# Patient Record
Sex: FEMALE | Race: WHITE | NOT HISPANIC OR LATINO | Employment: OTHER | URBAN - METROPOLITAN AREA
[De-identification: names, ages, dates, MRNs, and addresses within clinical notes are randomized per-mention and may not be internally consistent; named-entity substitution may affect disease eponyms.]

---

## 2017-01-30 ENCOUNTER — ALLSCRIPTS OFFICE VISIT (OUTPATIENT)
Dept: OTHER | Facility: OTHER | Age: 82
End: 2017-01-30

## 2017-02-03 ENCOUNTER — OFFICE VISIT (OUTPATIENT)
Dept: URGENT CARE | Age: 82
End: 2017-02-03
Payer: COMMERCIAL

## 2017-02-03 PROCEDURE — 99203 OFFICE O/P NEW LOW 30 MIN: CPT | Performed by: FAMILY MEDICINE

## 2017-02-03 PROCEDURE — G0463 HOSPITAL OUTPT CLINIC VISIT: HCPCS | Performed by: FAMILY MEDICINE

## 2017-02-17 ENCOUNTER — GENERIC CONVERSION - ENCOUNTER (OUTPATIENT)
Dept: OTHER | Facility: OTHER | Age: 82
End: 2017-02-17

## 2017-03-07 ENCOUNTER — GENERIC CONVERSION - ENCOUNTER (OUTPATIENT)
Dept: OTHER | Facility: OTHER | Age: 82
End: 2017-03-07

## 2017-03-30 DIAGNOSIS — E03.9 HYPOTHYROIDISM: ICD-10-CM

## 2017-03-30 DIAGNOSIS — E78.5 HYPERLIPIDEMIA: ICD-10-CM

## 2017-05-04 ENCOUNTER — APPOINTMENT (OUTPATIENT)
Dept: LAB | Facility: CLINIC | Age: 82
End: 2017-05-04
Payer: COMMERCIAL

## 2017-05-04 DIAGNOSIS — E03.9 HYPOTHYROIDISM: ICD-10-CM

## 2017-05-04 DIAGNOSIS — E78.5 HYPERLIPIDEMIA: ICD-10-CM

## 2017-05-04 LAB
ALBUMIN SERPL BCP-MCNC: 3.3 G/DL (ref 3.5–5)
ALP SERPL-CCNC: 77 U/L (ref 46–116)
ALT SERPL W P-5'-P-CCNC: 28 U/L (ref 12–78)
ANION GAP SERPL CALCULATED.3IONS-SCNC: 6 MMOL/L (ref 4–13)
AST SERPL W P-5'-P-CCNC: 31 U/L (ref 5–45)
BASOPHILS # BLD AUTO: 0.03 THOUSANDS/ΜL (ref 0–0.1)
BASOPHILS NFR BLD AUTO: 1 % (ref 0–1)
BILIRUB SERPL-MCNC: 1.3 MG/DL (ref 0.2–1)
BUN SERPL-MCNC: 19 MG/DL (ref 5–25)
CALCIUM SERPL-MCNC: 9.2 MG/DL (ref 8.3–10.1)
CHLORIDE SERPL-SCNC: 106 MMOL/L (ref 100–108)
CHOLEST SERPL-MCNC: 236 MG/DL (ref 50–200)
CO2 SERPL-SCNC: 28 MMOL/L (ref 21–32)
CREAT SERPL-MCNC: 0.73 MG/DL (ref 0.6–1.3)
EOSINOPHIL # BLD AUTO: 0.15 THOUSAND/ΜL (ref 0–0.61)
EOSINOPHIL NFR BLD AUTO: 2 % (ref 0–6)
ERYTHROCYTE [DISTWIDTH] IN BLOOD BY AUTOMATED COUNT: 14.2 % (ref 11.6–15.1)
GFR SERPL CREATININE-BSD FRML MDRD: >60 ML/MIN/1.73SQ M
GLUCOSE P FAST SERPL-MCNC: 85 MG/DL (ref 65–99)
HCT VFR BLD AUTO: 42.1 % (ref 34.8–46.1)
HDLC SERPL-MCNC: 51 MG/DL (ref 40–60)
HGB BLD-MCNC: 13 G/DL (ref 11.5–15.4)
LDLC SERPL CALC-MCNC: 158 MG/DL (ref 0–100)
LYMPHOCYTES # BLD AUTO: 2.93 THOUSANDS/ΜL (ref 0.6–4.47)
LYMPHOCYTES NFR BLD AUTO: 44 % (ref 14–44)
MCH RBC QN AUTO: 29.9 PG (ref 26.8–34.3)
MCHC RBC AUTO-ENTMCNC: 30.9 G/DL (ref 31.4–37.4)
MCV RBC AUTO: 97 FL (ref 82–98)
MONOCYTES # BLD AUTO: 0.51 THOUSAND/ΜL (ref 0.17–1.22)
MONOCYTES NFR BLD AUTO: 8 % (ref 4–12)
NEUTROPHILS # BLD AUTO: 3 THOUSANDS/ΜL (ref 1.85–7.62)
NEUTS SEG NFR BLD AUTO: 45 % (ref 43–75)
NRBC BLD AUTO-RTO: 0 /100 WBCS
PLATELET # BLD AUTO: 211 THOUSANDS/UL (ref 149–390)
PMV BLD AUTO: 9.6 FL (ref 8.9–12.7)
POTASSIUM SERPL-SCNC: 4.1 MMOL/L (ref 3.5–5.3)
PROT SERPL-MCNC: 7.3 G/DL (ref 6.4–8.2)
RBC # BLD AUTO: 4.35 MILLION/UL (ref 3.81–5.12)
SODIUM SERPL-SCNC: 140 MMOL/L (ref 136–145)
T4 FREE SERPL-MCNC: 1.18 NG/DL (ref 0.76–1.46)
TRIGL SERPL-MCNC: 133 MG/DL
TSH SERPL DL<=0.05 MIU/L-ACNC: 1.95 UIU/ML (ref 0.36–3.74)
WBC # BLD AUTO: 6.64 THOUSAND/UL (ref 4.31–10.16)

## 2017-05-04 PROCEDURE — 84443 ASSAY THYROID STIM HORMONE: CPT

## 2017-05-04 PROCEDURE — 80053 COMPREHEN METABOLIC PANEL: CPT

## 2017-05-04 PROCEDURE — 36415 COLL VENOUS BLD VENIPUNCTURE: CPT

## 2017-05-04 PROCEDURE — 84439 ASSAY OF FREE THYROXINE: CPT

## 2017-05-04 PROCEDURE — 85025 COMPLETE CBC W/AUTO DIFF WBC: CPT

## 2017-05-04 PROCEDURE — 80061 LIPID PANEL: CPT

## 2017-05-05 ENCOUNTER — GENERIC CONVERSION - ENCOUNTER (OUTPATIENT)
Dept: OTHER | Facility: OTHER | Age: 82
End: 2017-05-05

## 2017-06-05 ENCOUNTER — ALLSCRIPTS OFFICE VISIT (OUTPATIENT)
Dept: OTHER | Facility: OTHER | Age: 82
End: 2017-06-05

## 2017-07-10 ENCOUNTER — APPOINTMENT (OUTPATIENT)
Dept: LAB | Facility: CLINIC | Age: 82
End: 2017-07-10
Payer: COMMERCIAL

## 2017-07-10 DIAGNOSIS — B35.1 TINEA UNGUIUM: ICD-10-CM

## 2017-07-10 LAB
ALBUMIN SERPL BCP-MCNC: 3.5 G/DL (ref 3.5–5)
ALP SERPL-CCNC: 83 U/L (ref 46–116)
ALT SERPL W P-5'-P-CCNC: 25 U/L (ref 12–78)
AST SERPL W P-5'-P-CCNC: 26 U/L (ref 5–45)
BASOPHILS # BLD AUTO: 0.03 THOUSANDS/ΜL (ref 0–0.1)
BASOPHILS NFR BLD AUTO: 1 % (ref 0–1)
BILIRUB DIRECT SERPL-MCNC: 0.14 MG/DL (ref 0–0.2)
BILIRUB SERPL-MCNC: 0.63 MG/DL (ref 0.2–1)
EOSINOPHIL # BLD AUTO: 0.17 THOUSAND/ΜL (ref 0–0.61)
EOSINOPHIL NFR BLD AUTO: 3 % (ref 0–6)
ERYTHROCYTE [DISTWIDTH] IN BLOOD BY AUTOMATED COUNT: 14.6 % (ref 11.6–15.1)
HCT VFR BLD AUTO: 41.4 % (ref 34.8–46.1)
HGB BLD-MCNC: 12.7 G/DL (ref 11.5–15.4)
LYMPHOCYTES # BLD AUTO: 2.35 THOUSANDS/ΜL (ref 0.6–4.47)
LYMPHOCYTES NFR BLD AUTO: 37 % (ref 14–44)
MCH RBC QN AUTO: 29.5 PG (ref 26.8–34.3)
MCHC RBC AUTO-ENTMCNC: 30.7 G/DL (ref 31.4–37.4)
MCV RBC AUTO: 96 FL (ref 82–98)
MONOCYTES # BLD AUTO: 0.5 THOUSAND/ΜL (ref 0.17–1.22)
MONOCYTES NFR BLD AUTO: 8 % (ref 4–12)
NEUTROPHILS # BLD AUTO: 3.37 THOUSANDS/ΜL (ref 1.85–7.62)
NEUTS SEG NFR BLD AUTO: 51 % (ref 43–75)
NRBC BLD AUTO-RTO: 0 /100 WBCS
PLATELET # BLD AUTO: 216 THOUSANDS/UL (ref 149–390)
PMV BLD AUTO: 9.6 FL (ref 8.9–12.7)
PROT SERPL-MCNC: 7.5 G/DL (ref 6.4–8.2)
RBC # BLD AUTO: 4.31 MILLION/UL (ref 3.81–5.12)
WBC # BLD AUTO: 6.43 THOUSAND/UL (ref 4.31–10.16)

## 2017-07-10 PROCEDURE — 80076 HEPATIC FUNCTION PANEL: CPT

## 2017-07-10 PROCEDURE — 85025 COMPLETE CBC W/AUTO DIFF WBC: CPT

## 2017-07-10 PROCEDURE — 36415 COLL VENOUS BLD VENIPUNCTURE: CPT

## 2017-07-11 ENCOUNTER — GENERIC CONVERSION - ENCOUNTER (OUTPATIENT)
Dept: OTHER | Facility: OTHER | Age: 82
End: 2017-07-11

## 2017-09-05 DIAGNOSIS — B35.1 TINEA UNGUIUM: ICD-10-CM

## 2017-10-13 ENCOUNTER — ALLSCRIPTS OFFICE VISIT (OUTPATIENT)
Dept: OTHER | Facility: OTHER | Age: 82
End: 2017-10-13

## 2017-10-14 NOTE — PROGRESS NOTES
Assessment  1  Hypothyroidism (244 9) (E03 9)   2  Benign essential hypertension (401 1) (I10)   3  Mixed hyperlipidemia (272 2) (E78 2)   4  Palpitations (785 1) (R00 2)    Plan  Screening for genitourinary condition    · *VB - Urinary Incontinence Screen (Dx Z13 89 Screen for UI); Status:Complete - Retrospective By  Protocol Authorization;   Done: 84ZVJ6549 09:29AM    Discussion/Summary  Discussion Summary:   Hypothyroid: continue med, last TFTs in May 2017 normal  continue blood pressure medication(s)  Avoid saturated fats  Moving towards a more plant-based diet will also improve your cholesterol  previous EKG has shown frequent PACs, and pt has seen cardiology with [facility]  Chief Complaint  Chief Complaint Chronic Condition [facility]: Patient is here today for follow up of chronic conditions described in HPI  History of Present Illness  HPI: Hypothyroidism: patient reports compliance with med and no significant signs of overtreatment or undertreatment  patient watched diet for this  patient reports compliance with med(s) and no adverse side effects  No lightheadedness, no chest pain, no shortness of breath, no dry cough  Review of Systems  Complete-Female:   Constitutional: no fever,-- no chills-- and-- not feeling tired  Cardiovascular: the heart rate was not slow,-- no chest pain,-- the heart rate was not fast,-- no palpitations-- and-- no lower extremity edema  Respiratory: no shortness of breath,-- no cough,-- no wheezing-- and-- no shortness of breath during exertion  Gastrointestinal: no constipation-- and-- no diarrhea  Genitourinary: no dysuria-- and-- no incontinence  Psychiatric: no anxiety-- and-- no depression  Preventive Quality 65 Older:   Preventive Quality 65 and Older: Falls Risk: The patient fell 0 times in the past 12 months  Urinary Incontinence Symptoms includes: no urinary incontinence       Active Problems  1  Abnormal ECG (794 31) (R94 31)   2   Advance directive discussed with patient (V65 49) (Z71 89)   3  Allergic rhinitis (477 9) (J30 9)   4  Benign essential hypertension (401 1) (I10)   5  Cataract (366 9) (H26 9)   6  Encounter for screening for osteoporosis (V82 81) (Z13 820)   7  Hypothyroidism (244 9) (E03 9)   8  Insomnia (780 52) (G47 00)   9  Mixed hyperlipidemia (272 2) (E78 2)   10  Need for immunization against influenza (V04 81) (Z23)   11  Need for vaccination with 13-polyvalent pneumococcal conjugate vaccine (V03 82) (Z23)   12  Onychomycosis of toenail (110 1) (B35 1)   13  Screening for genitourinary condition (V81 6) (Z13 89)   14  Swelling of right knee joint (719 06) (M25 461)    Surgical History  1  History of Appendectomy   2  History of  Section   3  History of Gallbladder Surgery    Family History  Mother    1  Family history of lung cancer (V16 1) (Z80 1)   2  Family history of Lung cancer (162 9) (C34 90)  Father    3  Family history of Diabetes (250 00) (E11 9)   4  Family history of diabetes mellitus (V18 0) (Z83 3)  Son    5  Family history of atrial fibrillation (V17 49) (Z82 49)    Social History   · Former smoker (V1 82) (K44 755)   · Lives with adult children   · Never a smoker   · No alcohol use   · No drug use   · Retired   ·    ·     Current Meds   1  Fluticasone Propionate 50 MCG/ACT Nasal Suspension; USE 1 SPRAY IN EACH NOSTRIL ONCE   DAILY; Therapy: 31XLM6092 to Recorded   2  Ipratropium Bromide 0 03 % Nasal Solution; USE 2 SPRAYS IN EACH NOSTRIL 3 TIMES DAILY AS   NEEDED; Therapy: 2017 to (Evaluate:12Kfc6786)  Requested for: 2017; Last Rx:2017   Ordered   3  Levothyroxine Sodium 112 MCG Oral Tablet; TAKE 1 TABLET EVERY MORNING; Last Rx:2017   Ordered   4  Losartan Potassium 25 MG Oral Tablet; TAKE 1 TABLET DAILY AS DIRECTED; Therapy: 53EEG4001 to (Evaluate:2017)  Requested for: 69ETV9449; Last Rx:2017   Ordered   5  Metoprolol Tartrate 25 MG Oral Tablet;  Take 1 tablet twice daily; Therapy: 22QSQ7056 to (Evaluate:10Itk2832)  Requested for: 70MTR0452; Last Rx:13Dky7273   Ordered   6  Terbinafine HCl - 250 MG Oral Tablet; TAKE 1 TABLET DAILY; Therapy: 76YPR8095 to (Evaluate:67Xjx1274)  Requested for: 20YOM8601; Last Rx:50Shz2458   Ordered    Allergies  1  No Known Drug Allergies    Vitals  Vital Signs    Recorded: 36GAU1465 09:39AM Recorded: 43HAK6357 09:21AM   Temperature  97 5 F, Oral   Heart Rate  75   Respiration  16   Systolic 006    Diastolic 88    Weight  246 lb    BMI Calculated  33 2   BSA Calculated  1 74   O2 Saturation  95     Physical Exam    Constitutional   General appearance: No acute distress, well appearing and well nourished  Head and Face   Head and face: Normal     Eyes   Conjunctiva and lids: Abnormal  -- red conjuctiva  Ears, Nose, Mouth, and Throat   External inspection of ears and nose: Normal     Neck   Neck: Supple, symmetric, trachea midline, no masses  Thyroid: Normal, no thyromegaly  Pulmonary   Respiratory effort: No increased work of breathing or signs of respiratory distress  Auscultation of lungs: Clear to auscultation  Cardiovascular   Auscultation of heart: Abnormal   The heart rate was normal  The rhythm was irregularly irregular  Frequent ectopy on exam  no murmurs were heard  Carotid pulses: 2+ bilaterally  Examination of extremities for edema and/or varicosities: Normal     Lymphatic   Palpation of lymph nodes in neck: No lymphadenopathy  Neurologic   Cortical function: Normal mental status  Psychiatric   Judgment and insight: Normal     Orientation to person, place, and time: Normal     Recent and remote memory: Intact      Mood and affect: Normal        Results/Data  *VB - Urinary Incontinence Screen (Dx Z13 89 Screen for UI) 87DQA3828 09:29AM [email or web address]     Test Name Result Flag Reference   Urinary Incontinence Assessment 17WWQ5079       PHQ-2 Adult Depression Screening 65SSZ7586 09:26AM Álvaro Holt Test Name Result Flag Reference   PHQ-2 Adult Depression Score 0     Over the last two weeks, how often have you been bothered by any of the following problems? Little interest or pleasure in doing things: Not at all - 0  Feeling down, depressed, or hopeless: Not at all - 0   PHQ-2 Adult Depression Screening Negative         Health Management  Encounter for screening for osteoporosis   * DXA BONE DENSITY SPINE HIP AND PELVIS; every 2 weeks;  Next Permanently Deferred;     Signatures   Electronically signed by : LOPEZ Mena ; Oct 13 2017  9:47AM EST                       (Author)

## 2018-01-12 VITALS
WEIGHT: 169 LBS | TEMPERATURE: 97.4 F | SYSTOLIC BLOOD PRESSURE: 120 MMHG | OXYGEN SATURATION: 98 % | HEART RATE: 71 BPM | HEIGHT: 60 IN | RESPIRATION RATE: 16 BRPM | BODY MASS INDEX: 33.18 KG/M2 | DIASTOLIC BLOOD PRESSURE: 66 MMHG

## 2018-01-13 VITALS
TEMPERATURE: 97.5 F | BODY MASS INDEX: 33.2 KG/M2 | HEART RATE: 75 BPM | SYSTOLIC BLOOD PRESSURE: 130 MMHG | RESPIRATION RATE: 16 BRPM | WEIGHT: 170 LBS | DIASTOLIC BLOOD PRESSURE: 88 MMHG | OXYGEN SATURATION: 95 %

## 2018-01-13 NOTE — RESULT NOTES
Verified Results  (1) CBC/PLT/DIFF 25WZE8111 11:09AM Rachel Tobias Order Number: [id number]     Test Name Result Flag Reference   WBC COUNT 6 64 Thousand/uL  4 31-10 16   RBC COUNT 4 35 Million/uL  3 81-5 12   HEMOGLOBIN 13 0 g/dL  11 5-15 4   HEMATOCRIT 42 1 %  34 8-46  1   MCV 97 fL  82-98   MCH 29 9 pg  26 8-34 3   MCHC 30 9 g/dL L 31 4-37 4   RDW 14 2 %  11 6-15 1   MPV 9 6 fL  8 9-12 7   PLATELET COUNT 609 Thousands/uL  149-390   nRBC AUTOMATED 0 /100 WBCs     NEUTROPHILS RELATIVE PERCENT 45 %  43-75   LYMPHOCYTES RELATIVE PERCENT 44 %  14-44   MONOCYTES RELATIVE PERCENT 8 %  4-12   EOSINOPHILS RELATIVE PERCENT 2 %  0-6   BASOPHILS RELATIVE PERCENT 1 %  0-1   NEUTROPHILS ABSOLUTE COUNT 3 00 Thousands/? ??L  1 85-7 62   LYMPHOCYTES ABSOLUTE COUNT 2 93 Thousands/? ??L  0 60-4 47   MONOCYTES ABSOLUTE COUNT 0 51 Thousand/? ??L  0 17-1 22   EOSINOPHILS ABSOLUTE COUNT 0 15 Thousand/? ??L  0 00-0 61   BASOPHILS ABSOLUTE COUNT 0 03 Thousands/? ??L  0 00-0 10     (1) COMPREHENSIVE METABOLIC PANEL 03VGH6643 01:44MQ Rachel Tobias Order Number: [id number]     Test Name Result Flag Reference   SODIUM 140 mmol/L  136-145   POTASSIUM 4 1 mmol/L  3 5-5 3   CHLORIDE 106 mmol/L  100-108   CARBON DIOXIDE 28 mmol/L  21-32   ANION GAP (CALC) 6 mmol/L  4-13   BLOOD UREA NITROGEN 19 mg/dL  5-25   CREATININE 0 73 mg/dL  0 60-1 30   Standardized to IDMS reference method   CALCIUM 9 2 mg/dL  8 3-10 1   BILI, TOTAL 1 30 mg/dL H 0 20-1 00   ALK PHOSPHATAS 77 U/L     ALT (SGPT) 28 U/L  12-78   AST(SGOT) 31 U/L  5-45   ALBUMIN 3 3 g/dL L 3 5-5 0   TOTAL PROTEIN 7 3 g/dL  6 4-8 2   eGFR Non-African American      >60 0 ml/min/1 73sq [facility] Disease Education Program recommendations are as follows:  GFR calculation is accurate only with a steady state creatinine  Chronic Kidney disease less than 60 ml/min/1 73 sq  meters  Kidney failure less than 15 ml/min/1 73 sq  meters     GLUCOSE FASTING 85 mg/dL  65-99     (1) LIPID PANEL, FASTING 69FTI5582 11:09AM [email or web address] Order Number: [id number]     Test Name Result Flag Reference   CHOLESTEROL 236 mg/dL H    HDL,DIRECT 51 mg/dL  40-60   Specimen collection should occur prior to Metamizole administration due to the potential for falsely depressed results  LDL CHOLESTEROL CALCULATED 158 mg/dL H 0-100   Triglyceride:         Normal              <150 mg/dl       Borderline High    150-199 mg/dl       High               200-499 mg/dl       Very High          >499 mg/dl  Cholesterol:         Desirable        <200 mg/dl      Borderline High  200-239 mg/dl      High             >239 mg/dl  HDL Cholesterol:        High    >59 mg/dL      Low     <41 mg/dL  LDL CALCULATED:    This screening LDL is a calculated result  It does not have the accuracy of the Direct Measured LDL in the monitoring of patients with hyperlipidemia and/or statin therapy  Direct Measure LDL (TCM035) must be ordered separately in these patients  TRIGLYCERIDES 133 mg/dL  <=150   Specimen collection should occur prior to N-Acetylcysteine or Metamizole administration due to the potential for falsely depressed results  (1) T4, FREE 43CTW6373 11:09AM [email or web address] Order Number: [id number]     Test Name Result Flag Reference   T4,FREE 1 18 ng/dL  0 76-1 46     (1) TSH 64CMA5500 11:09AM [employer] Order Number: [id number]     Test Name Result Flag Reference   TSH 1 950 uIU/mL  0 358-3 740   Patients undergoing fluorescein dye angiography may retain small amounts of fluorescein in the body for 48-72 hours post procedure  Samples containing fluorescein can produce falsely depressed TSH values  If the patient had this procedure,a specimen should be resubmitted post fluorescein clearance            The recommended reference ranges for TSH during pregnancy are as follows:  First trimester 0 1 to 2 5 uIU/mL  Second trimester  0 2 to 3 0 uIU/mL  Third trimester 0 3 to 3 0 uIU/m

## 2018-01-14 VITALS
BODY MASS INDEX: 33.18 KG/M2 | HEIGHT: 60 IN | RESPIRATION RATE: 16 BRPM | OXYGEN SATURATION: 96 % | HEART RATE: 69 BPM | DIASTOLIC BLOOD PRESSURE: 82 MMHG | SYSTOLIC BLOOD PRESSURE: 124 MMHG | WEIGHT: 169.03 LBS | TEMPERATURE: 97.6 F

## 2018-01-15 NOTE — RESULT NOTES
Discussion/Summary   CBC and LFTs normal     Verified Results  (1) CBC/PLT/DIFF 57YRX2408 09:39AM Kylie Stone Order Number: [id number]     Test Name Result Flag Reference   WBC COUNT 6 43 Thousand/uL  4 31-10 16   RBC COUNT 4 31 Million/uL  3 81-5 12   HEMOGLOBIN 12 7 g/dL  11 5-15 4   HEMATOCRIT 41 4 %  34 8-46  1   MCV 96 fL  82-98   MCH 29 5 pg  26 8-34 3   MCHC 30 7 g/dL L 31 4-37 4   RDW 14 6 %  11 6-15 1   MPV 9 6 fL  8 9-12 7   PLATELET COUNT 042 Thousands/uL  149-390   nRBC AUTOMATED 0 /100 WBCs     NEUTROPHILS RELATIVE PERCENT 51 %  43-75   LYMPHOCYTES RELATIVE PERCENT 37 %  14-44   MONOCYTES RELATIVE PERCENT 8 %  4-12   EOSINOPHILS RELATIVE PERCENT 3 %  0-6   BASOPHILS RELATIVE PERCENT 1 %  0-1   NEUTROPHILS ABSOLUTE COUNT 3 37 Thousands/? ??L  1 85-7 62   LYMPHOCYTES ABSOLUTE COUNT 2 35 Thousands/? ??L  0 60-4 47   MONOCYTES ABSOLUTE COUNT 0 50 Thousand/? ??L  0 17-1 22   EOSINOPHILS ABSOLUTE COUNT 0 17 Thousand/? ??L  0 00-0 61   BASOPHILS ABSOLUTE COUNT 0 03 Thousands/? ??L  0 00-0 10     (1) HEPATIC FUNCTION PANEL 55Kbl3035 09:39AM Kylie Stone Order Number: [id number]     Test Name Result Flag Reference   ALBUMIN 3 5 g/dL  3 5-5 0   ALK PHOSPHATAS 83 U/L     ALT (SGPT) 25 U/L  12-78   AST(SGOT) 26 U/L  5-45   BILI, DIRECT 0 14 mg/dL  0 00-0 20   BILI, TOTAL 0 63 mg/dL  0 20-1 00   TOTAL PROTEIN 7 5 g/dL  6 4-8 2

## 2018-02-01 ENCOUNTER — TELEPHONE (OUTPATIENT)
Dept: INTERNAL MEDICINE CLINIC | Facility: CLINIC | Age: 83
End: 2018-02-01

## 2018-02-01 NOTE — TELEPHONE ENCOUNTER
Spoke to patient  She just check her BP with a family members monitor and it was 173/67  She will start new dose and she will be calling on Monday to follow-up

## 2018-02-05 ENCOUNTER — TELEPHONE (OUTPATIENT)
Dept: INTERNAL MEDICINE CLINIC | Facility: CLINIC | Age: 83
End: 2018-02-05

## 2018-02-15 ENCOUNTER — OFFICE VISIT (OUTPATIENT)
Dept: INTERNAL MEDICINE CLINIC | Facility: CLINIC | Age: 83
End: 2018-02-15
Payer: COMMERCIAL

## 2018-02-15 VITALS
BODY MASS INDEX: 31.98 KG/M2 | WEIGHT: 169.4 LBS | RESPIRATION RATE: 16 BRPM | OXYGEN SATURATION: 95 % | HEART RATE: 77 BPM | DIASTOLIC BLOOD PRESSURE: 80 MMHG | HEIGHT: 61 IN | SYSTOLIC BLOOD PRESSURE: 138 MMHG

## 2018-02-15 DIAGNOSIS — E03.9 HYPOTHYROIDISM, UNSPECIFIED TYPE: ICD-10-CM

## 2018-02-15 DIAGNOSIS — E78.2 MIXED HYPERLIPIDEMIA: ICD-10-CM

## 2018-02-15 DIAGNOSIS — R44.1 HALLUCINATIONS, VISUAL: Primary | ICD-10-CM

## 2018-02-15 DIAGNOSIS — I10 HTN (HYPERTENSION), BENIGN: ICD-10-CM

## 2018-02-15 PROBLEM — R00.2 PALPITATIONS: Status: ACTIVE | Noted: 2017-10-13

## 2018-02-15 PROCEDURE — 3725F SCREEN DEPRESSION PERFORMED: CPT | Performed by: INTERNAL MEDICINE

## 2018-02-15 PROCEDURE — 1101F PT FALLS ASSESS-DOCD LE1/YR: CPT | Performed by: INTERNAL MEDICINE

## 2018-02-15 PROCEDURE — 99214 OFFICE O/P EST MOD 30 MIN: CPT | Performed by: INTERNAL MEDICINE

## 2018-02-15 RX ORDER — LOSARTAN POTASSIUM 25 MG/1
1 TABLET ORAL DAILY
COMMUNITY
Start: 2016-11-18 | End: 2018-04-18 | Stop reason: SDUPTHER

## 2018-02-15 RX ORDER — IPRATROPIUM BROMIDE 21 UG/1
2 SPRAY, METERED NASAL 3 TIMES DAILY PRN
COMMUNITY
Start: 2017-08-11 | End: 2019-03-26

## 2018-02-15 RX ORDER — FLUTICASONE PROPIONATE 50 MCG
1 SPRAY, SUSPENSION (ML) NASAL DAILY
COMMUNITY
Start: 2015-03-11 | End: 2019-03-26

## 2018-02-15 RX ORDER — TERBINAFINE HYDROCHLORIDE 250 MG/1
1 TABLET ORAL DAILY
COMMUNITY
Start: 2017-06-05 | End: 2018-10-25 | Stop reason: ALTCHOICE

## 2018-02-15 RX ORDER — CARVEDILOL 6.25 MG/1
6.25 TABLET ORAL 2 TIMES DAILY WITH MEALS
Qty: 60 TABLET | Refills: 5 | Status: SHIPPED | OUTPATIENT
Start: 2018-02-15 | End: 2018-08-10 | Stop reason: SDUPTHER

## 2018-02-15 RX ORDER — LEVOTHYROXINE SODIUM 112 UG/1
1 TABLET ORAL
COMMUNITY
End: 2018-12-03 | Stop reason: SDUPTHER

## 2018-02-15 NOTE — ASSESSMENT & PLAN NOTE
Most likely related to some preoccupation with thoughts about her  who passed away, possibly related with some underlying anxiety, combined with some mild sleep deprivation  Will check for other etiologies including urinary tract infection which be unlikely going on this long, structural abnormality with MRI of the brain, and checking routine lab work  On history she does not report any medications that would cause this, but will switch metoprolol to carvedilol as possible cause of hallucinations  Will also refer Neurology  Patient is interacting appropriately on exam with no signs of dementia    Discussed exploring more about possible underlying depression/anxiety if workup all normal

## 2018-02-15 NOTE — PROGRESS NOTES
Assessment/Plan:    Hallucinations, visual   Most likely related to some preoccupation with thoughts about her  who passed away, possibly related with some underlying anxiety, combined with some mild sleep deprivation  Will check for other etiologies including urinary tract infection which be unlikely going on this long, structural abnormality with MRI of the brain, and checking routine lab work  On history she does not report any medications that would cause this, but will switch metoprolol to carvedilol as possible cause of hallucinations  Will also refer Neurology  Patient is interacting appropriately on exam with no signs of dementia  Discussed exploring more about possible underlying depression/anxiety if workup all normal     Hypothyroidism   Continue medication, last thyroid function tests were normal, but will recheck now    Mixed hyperlipidemia   Continue to watch diet for this  Benign essential hypertension    Will change metoprolol to carvedilol as possible etiology of hallucinations  Diagnoses and all orders for this visit:    Hallucinations, visual  -     Comprehensive metabolic panel; Future  -     RPR; Future  -     TSH, 3rd generation with T4 reflex; Future  -     UA w Reflex to Microscopic w Reflex to Culture  -     Vitamin B12; Future  -     Vitamin D 25 hydroxy; Future  -     MRI brain wo contrast; Future  -     Ambulatory referral to Neurology; Future    HTN (hypertension), benign  -     carvedilol (COREG) 6 25 mg tablet; Take 1 tablet (6 25 mg total) by mouth 2 (two) times a day with meals    Hypothyroidism, unspecified type  -     TSH, 3rd generation with T4 reflex; Future    Mixed hyperlipidemia          Subjective:      Patient ID: James Villa Case is a 80 y o  female  Hallucinations: pt has had some hallucinations  2 weeks ago she saw 5 little girls in her bedroom, they didn't talk  She saw he dead  about a month ago    She saw a naked woman in her bedroom several months ago  They weren't scary hallucinations  She reports she was not dreaming  No other cognitive complaints  No seizure activity or headaches  No OTC cold or cough meds, no pain meds, no muscle relaxants, no sleep aids  No new meds  Pt doesn't sleep great at night  She did try zolpidem a few years ago which caused severe confusion  No UTI symptoms  Pt denies anxiety or depression, but she does report thinking about her  "all the time" since he  about 4 years ago  hypertension:  Patient had a high blood pressure reading at foot doctor     hypothyroidism: Patient reports compliance with her thyroid med        The following portions of the patient's history were reviewed and updated as appropriate: allergies, current medications, past family history, past medical history, past social history, past surgical history and problem list     No family history on file  No past medical history on file  Social History     Social History    Marital status:      Spouse name: N/A    Number of children: N/A    Years of education: N/A     Occupational History    Not on file       Social History Main Topics    Smoking status: Not on file    Smokeless tobacco: Not on file    Alcohol use Not on file    Drug use: Unknown    Sexual activity: Not on file     Other Topics Concern    Not on file     Social History Narrative    No narrative on file       Current Outpatient Prescriptions:     levothyroxine 112 mcg tablet, Take 1 tablet by mouth, Disp: , Rfl:     losartan (COZAAR) 25 mg tablet, Take 1 tablet by mouth daily, Disp: , Rfl:     carvedilol (COREG) 6 25 mg tablet, Take 1 tablet (6 25 mg total) by mouth 2 (two) times a day with meals, Disp: 60 tablet, Rfl: 5    fluticasone (FLONASE) 50 mcg/act nasal spray, 1 spray into each nostril daily, Disp: , Rfl:     ipratropium (ATROVENT) 0 03 % nasal spray, 2 sprays into each nostril 3 (three) times a day as needed, Disp: , Rfl:    terbinafine (LamISIL) 250 mg tablet, Take 1 tablet by mouth daily, Disp: , Rfl:   No Known Allergies    Review of Systems   Constitutional: Negative for chills, fatigue and fever  HENT: Negative for congestion, nosebleeds, postnasal drip and sore throat  Eyes: Negative for pain and visual disturbance  Respiratory: Negative for cough, chest tightness, shortness of breath and wheezing  Cardiovascular: Negative for chest pain, palpitations and leg swelling  Gastrointestinal: Negative for abdominal pain, constipation, diarrhea, nausea and vomiting  Endocrine: Negative for polydipsia and polyuria  Genitourinary: Negative for dysuria, flank pain and hematuria  Musculoskeletal: Negative for arthralgias  Skin: Negative for rash  Neurological: Negative for dizziness, tremors, seizures, syncope and headaches  Hematological: Does not bruise/bleed easily  Psychiatric/Behavioral: Positive for hallucinations and sleep disturbance  Negative for agitation, behavioral problems, confusion, decreased concentration, dysphoric mood, self-injury and suicidal ideas  The patient is not nervous/anxious and is not hyperactive  Objective:    Vitals:    02/15/18 0926   BP: 138/80   Pulse: 77   Resp: 16   SpO2: 95%        Physical Exam   Constitutional: She is oriented to person, place, and time  She appears well-developed and well-nourished  No distress  HENT:   Head: Normocephalic and atraumatic  Right Ear: External ear normal    Left Ear: External ear normal    Eyes: No scleral icterus  Neck: Normal range of motion  Neck supple  No tracheal deviation present  No thyromegaly present  Cardiovascular: Normal rate, regular rhythm and normal heart sounds  Pulmonary/Chest: Effort normal and breath sounds normal  No respiratory distress  She has no wheezes  She has no rales  Abdominal: Soft  Bowel sounds are normal  There is no tenderness  There is no rebound and no guarding     Musculoskeletal: She exhibits no edema  Lymphadenopathy:     She has no cervical adenopathy  Neurological: She is alert and oriented to person, place, and time  Psychiatric: She has a normal mood and affect  Her behavior is normal  Judgment and thought content normal    Vitals reviewed

## 2018-02-19 ENCOUNTER — TELEPHONE (OUTPATIENT)
Dept: INTERNAL MEDICINE CLINIC | Facility: CLINIC | Age: 83
End: 2018-02-19

## 2018-02-19 ENCOUNTER — APPOINTMENT (OUTPATIENT)
Dept: LAB | Facility: CLINIC | Age: 83
End: 2018-02-19
Payer: COMMERCIAL

## 2018-02-19 DIAGNOSIS — R44.1 HALLUCINATIONS, VISUAL: ICD-10-CM

## 2018-02-19 DIAGNOSIS — E55.9 VITAMIN D DEFICIENCY: Primary | ICD-10-CM

## 2018-02-19 DIAGNOSIS — E03.9 HYPOTHYROIDISM, UNSPECIFIED TYPE: ICD-10-CM

## 2018-02-19 LAB
25(OH)D3 SERPL-MCNC: 6 NG/ML (ref 30–100)
ALBUMIN SERPL BCP-MCNC: 3.4 G/DL (ref 3.5–5)
ALP SERPL-CCNC: 80 U/L (ref 46–116)
ALT SERPL W P-5'-P-CCNC: 25 U/L (ref 12–78)
ANION GAP SERPL CALCULATED.3IONS-SCNC: 5 MMOL/L (ref 4–13)
AST SERPL W P-5'-P-CCNC: 29 U/L (ref 5–45)
BACTERIA UR QL AUTO: NORMAL /HPF
BILIRUB SERPL-MCNC: 0.94 MG/DL (ref 0.2–1)
BILIRUB UR QL STRIP: NEGATIVE
BUN SERPL-MCNC: 17 MG/DL (ref 5–25)
CALCIUM SERPL-MCNC: 9.2 MG/DL (ref 8.3–10.1)
CHLORIDE SERPL-SCNC: 105 MMOL/L (ref 100–108)
CLARITY UR: CLEAR
CO2 SERPL-SCNC: 29 MMOL/L (ref 21–32)
COLOR UR: YELLOW
CREAT SERPL-MCNC: 0.76 MG/DL (ref 0.6–1.3)
GFR SERPL CREATININE-BSD FRML MDRD: 67 ML/MIN/1.73SQ M
GLUCOSE P FAST SERPL-MCNC: 90 MG/DL (ref 65–99)
GLUCOSE UR STRIP-MCNC: NEGATIVE MG/DL
HGB UR QL STRIP.AUTO: ABNORMAL
KETONES UR STRIP-MCNC: NEGATIVE MG/DL
LEUKOCYTE ESTERASE UR QL STRIP: ABNORMAL
NITRITE UR QL STRIP: NEGATIVE
NON-SQ EPI CELLS URNS QL MICRO: NORMAL /HPF
PH UR STRIP.AUTO: 6 [PH] (ref 4.5–8)
POTASSIUM SERPL-SCNC: 3.9 MMOL/L (ref 3.5–5.3)
PROT SERPL-MCNC: 7.6 G/DL (ref 6.4–8.2)
PROT UR STRIP-MCNC: NEGATIVE MG/DL
RBC #/AREA URNS AUTO: NORMAL /HPF
SODIUM SERPL-SCNC: 139 MMOL/L (ref 136–145)
SP GR UR STRIP.AUTO: 1.01 (ref 1–1.03)
TSH SERPL DL<=0.05 MIU/L-ACNC: 2.34 UIU/ML (ref 0.36–3.74)
UROBILINOGEN UR QL STRIP.AUTO: 0.2 E.U./DL
VIT B12 SERPL-MCNC: 345 PG/ML (ref 100–900)
WBC #/AREA URNS AUTO: NORMAL /HPF

## 2018-02-19 PROCEDURE — 36415 COLL VENOUS BLD VENIPUNCTURE: CPT

## 2018-02-19 PROCEDURE — 84443 ASSAY THYROID STIM HORMONE: CPT

## 2018-02-19 PROCEDURE — 82607 VITAMIN B-12: CPT

## 2018-02-19 PROCEDURE — 81001 URINALYSIS AUTO W/SCOPE: CPT | Performed by: INTERNAL MEDICINE

## 2018-02-19 PROCEDURE — 86592 SYPHILIS TEST NON-TREP QUAL: CPT

## 2018-02-19 PROCEDURE — 80053 COMPREHEN METABOLIC PANEL: CPT

## 2018-02-19 PROCEDURE — 82306 VITAMIN D 25 HYDROXY: CPT

## 2018-02-19 RX ORDER — ERGOCALCIFEROL 1.25 MG/1
50000 CAPSULE ORAL WEEKLY
Qty: 4 CAPSULE | Refills: 3 | Status: SHIPPED | OUTPATIENT
Start: 2018-02-19 | End: 2018-10-25 | Stop reason: ALTCHOICE

## 2018-02-19 NOTE — TELEPHONE ENCOUNTER
Pt stated she was informed by our office to increase losartan to two pills, her prescription bottle states she should only take one  Is she to continue to take 2 pills?

## 2018-02-19 NOTE — TELEPHONE ENCOUNTER
Carvedilol should be twice a day, losartan once a day  If BP elevated, I could increase the losartan from 25 to 50 mg, but BP was good when she was here  If she has been taking 50 mg all along, let me know, and I will refill at 50 mg

## 2018-02-20 LAB — RPR SER QL: NORMAL

## 2018-03-12 ENCOUNTER — TELEPHONE (OUTPATIENT)
Dept: OTHER | Facility: HOSPITAL | Age: 83
End: 2018-03-12

## 2018-03-12 NOTE — TELEPHONE ENCOUNTER
Pt called in stating she took her vitamin d for 4 weeks and is unsure if you want her to continue taking it for another 4 weeks (there's refills on her script) or if you are going to lower her dosage as she states was discussed when you put her on the medication    I advised her you are on vacation and she states it is non-urgent

## 2018-03-13 ENCOUNTER — TELEPHONE (OUTPATIENT)
Dept: OTHER | Facility: HOSPITAL | Age: 83
End: 2018-03-13

## 2018-03-13 NOTE — TELEPHONE ENCOUNTER
Pt called in stating she took her vitamin d for 4 weeks and is unsure if Dr Mario Osullivan wants her to continue taking it for another 4 weeks (there's refills on her script) or if he is going to lower her dosage as she states was discussed when she was put her on the medication - I advised her yesterday that he was on vacation and she wanted it to still go to him and called back into today asking what someone else thinks as she doesn't want to wait until his return now

## 2018-03-18 NOTE — TELEPHONE ENCOUNTER
Yes, she should refill the high dose vitamin-D and continue taking it for a total of 3 months    After that she can just take 2000 units daily over-the-counter of vitamin D

## 2018-04-18 DIAGNOSIS — I10 BENIGN ESSENTIAL HYPERTENSION: Primary | ICD-10-CM

## 2018-04-19 RX ORDER — LOSARTAN POTASSIUM 25 MG/1
25 TABLET ORAL DAILY
Qty: 30 TABLET | Refills: 5 | Status: SHIPPED | OUTPATIENT
Start: 2018-04-19 | End: 2018-06-14 | Stop reason: SDUPTHER

## 2018-05-01 ENCOUNTER — TELEPHONE (OUTPATIENT)
Dept: INTERNAL MEDICINE CLINIC | Facility: CLINIC | Age: 83
End: 2018-05-01

## 2018-05-01 NOTE — TELEPHONE ENCOUNTER
Patient is asking if you would like her to continue the Vitamin D2 50,000 units  Patient said she was told she should take that dose for 3 months and then it would be lowered    Pharmacy:[facility]    Please advise

## 2018-06-14 ENCOUNTER — OFFICE VISIT (OUTPATIENT)
Dept: INTERNAL MEDICINE CLINIC | Facility: CLINIC | Age: 83
End: 2018-06-14
Payer: COMMERCIAL

## 2018-06-14 VITALS
HEART RATE: 81 BPM | HEIGHT: 61 IN | DIASTOLIC BLOOD PRESSURE: 98 MMHG | OXYGEN SATURATION: 98 % | SYSTOLIC BLOOD PRESSURE: 160 MMHG | BODY MASS INDEX: 31.15 KG/M2 | WEIGHT: 165 LBS | TEMPERATURE: 97.7 F

## 2018-06-14 DIAGNOSIS — E55.9 VITAMIN D DEFICIENCY: ICD-10-CM

## 2018-06-14 DIAGNOSIS — I10 BENIGN ESSENTIAL HYPERTENSION: Primary | ICD-10-CM

## 2018-06-14 DIAGNOSIS — E03.9 HYPOTHYROIDISM, UNSPECIFIED TYPE: ICD-10-CM

## 2018-06-14 DIAGNOSIS — R00.2 PALPITATIONS: ICD-10-CM

## 2018-06-14 DIAGNOSIS — R44.1 HALLUCINATIONS, VISUAL: ICD-10-CM

## 2018-06-14 PROCEDURE — 99214 OFFICE O/P EST MOD 30 MIN: CPT | Performed by: INTERNAL MEDICINE

## 2018-06-14 RX ORDER — LOSARTAN POTASSIUM 50 MG/1
50 TABLET ORAL DAILY
Qty: 90 TABLET | Refills: 3 | Status: SHIPPED | OUTPATIENT
Start: 2018-06-14 | End: 2019-06-25 | Stop reason: SDUPTHER

## 2018-06-14 NOTE — PROGRESS NOTES
Assessment/Plan:    Benign essential hypertension   Blood pressure is elevated, I recommend increasing losartan 25 mg daily to 50 mg daily, with considerations of increasing to 100 mg  if still not controlled  Hypothyroidism    Continue levothyroxine 112 mcg daily    Palpitations   Patient asymptomatic, has frequent ectopy on exam     Vitamin D deficiency    Continue vitamin-D, will check labs before next visit  Hallucinations, visual    Improved, possibly secondary to metoprolol? Diagnoses and all orders for this visit:    Benign essential hypertension  -     losartan (COZAAR) 50 mg tablet; Take 1 tablet (50 mg total) by mouth daily    Palpitations    Vitamin D deficiency  -     Vitamin D 25 hydroxy; Future    Hallucinations, visual    Hypothyroidism, unspecified type  -     TSH, 3rd generation with Free T4 reflex; Future          Subjective:      Patient ID: Santino Fenton Case is a 80 y o  female  Loose in a shins: These have resolved  Changes made were adding vitamin D for a very low vitamin-D level and changing metoprolol to carvedilol  Hallucinations are listed as a possible side effect of metoprolol  Vitamin-D deficiency:  Patient had I dose replacement, now is on 2000 units daily  Hypertension: Patient tolerating blood pressure meds and reports compliance with meds  Hypothyroidism:  Patient reports compliance with thyroid med, no excessive fatigue, cold intolerance, constipation, or depression  The following portions of the patient's history were reviewed and updated as appropriate: allergies, current medications, past family history, past medical history, past social history, past surgical history and problem list     Review of Systems   Constitutional: Negative for chills, fatigue and fever  HENT: Negative for congestion, nosebleeds, postnasal drip, sore throat and trouble swallowing  Eyes: Negative for pain     Respiratory: Negative for cough, chest tightness, shortness of breath and wheezing  Cardiovascular: Negative for chest pain, palpitations and leg swelling  Gastrointestinal: Negative for abdominal pain, constipation, diarrhea, nausea and vomiting  Endocrine: Negative for polydipsia and polyuria  Genitourinary: Negative for dysuria, flank pain and hematuria  Musculoskeletal: Negative for arthralgias  Skin: Negative for rash  Neurological: Negative for dizziness, tremors and headaches  Hematological: Does not bruise/bleed easily  Psychiatric/Behavioral: Negative for confusion and dysphoric mood  The patient is not nervous/anxious  Objective:      /98   Pulse 81   Temp 97 7 °F (36 5 °C)   Ht 5' 1" (1 549 m)   Wt 74 8 kg (165 lb)   SpO2 98%   BMI 31 18 kg/m²          Physical Exam   Constitutional: She is oriented to person, place, and time  She appears well-developed and well-nourished  No distress  HENT:   Head: Normocephalic and atraumatic  Right Ear: External ear normal    Left Ear: External ear normal    Eyes: No scleral icterus  Red conjuctiva   Neck: Normal range of motion  Neck supple  No tracheal deviation present  No thyromegaly present  Cardiovascular: Normal rate, regular rhythm and normal heart sounds  Frequent extrasystoles are present  Pulmonary/Chest: Effort normal and breath sounds normal  No respiratory distress  She has no wheezes  She has no rales  Abdominal: Soft  Bowel sounds are normal  There is no tenderness  There is no rebound and no guarding  Musculoskeletal: She exhibits no edema  Lymphadenopathy:     She has no cervical adenopathy  Neurological: She is alert and oriented to person, place, and time  Psychiatric: She has a normal mood and affect  Her behavior is normal  Judgment and thought content normal    Vitals reviewed

## 2018-06-14 NOTE — PATIENT INSTRUCTIONS
Problem List Items Addressed This Visit     Benign essential hypertension - Primary      Blood pressure is elevated, I recommend increasing losartan 25 mg daily to 50 mg daily, with considerations of increasing to 100 mg  if still not controlled  Relevant Medications    losartan (COZAAR) 50 mg tablet    Hypothyroidism       Continue levothyroxine 112 mcg daily         Relevant Orders    TSH, 3rd generation with Free T4 reflex    Palpitations      Patient asymptomatic, has frequent ectopy on exam          Hallucinations, visual       Improved, possibly secondary to metoprolol? Vitamin D deficiency       Continue vitamin-D, will check labs before next visit           Relevant Orders    Vitamin D 25 hydroxy

## 2018-06-14 NOTE — ASSESSMENT & PLAN NOTE
Blood pressure is elevated, I recommend increasing losartan 25 mg daily to 50 mg daily, with considerations of increasing to 100 mg  if still not controlled

## 2018-08-10 DIAGNOSIS — I10 HTN (HYPERTENSION), BENIGN: ICD-10-CM

## 2018-08-10 RX ORDER — CARVEDILOL 6.25 MG/1
6.25 TABLET ORAL 2 TIMES DAILY WITH MEALS
Qty: 180 TABLET | Refills: 1 | Status: SHIPPED | OUTPATIENT
Start: 2018-08-10 | End: 2019-02-01 | Stop reason: SDUPTHER

## 2018-10-08 ENCOUNTER — TRANSCRIBE ORDERS (OUTPATIENT)
Dept: LAB | Facility: CLINIC | Age: 83
End: 2018-10-08

## 2018-10-08 ENCOUNTER — APPOINTMENT (OUTPATIENT)
Dept: LAB | Facility: CLINIC | Age: 83
End: 2018-10-08
Payer: COMMERCIAL

## 2018-10-08 DIAGNOSIS — E55.9 VITAMIN D DEFICIENCY: ICD-10-CM

## 2018-10-08 DIAGNOSIS — E03.9 HYPOTHYROIDISM, UNSPECIFIED TYPE: ICD-10-CM

## 2018-10-08 LAB
25(OH)D3 SERPL-MCNC: 18.9 NG/ML (ref 30–100)
TSH SERPL DL<=0.05 MIU/L-ACNC: 0.96 UIU/ML (ref 0.36–3.74)

## 2018-10-08 PROCEDURE — 82306 VITAMIN D 25 HYDROXY: CPT

## 2018-10-08 PROCEDURE — 36415 COLL VENOUS BLD VENIPUNCTURE: CPT

## 2018-10-08 PROCEDURE — 84443 ASSAY THYROID STIM HORMONE: CPT

## 2018-10-25 ENCOUNTER — OFFICE VISIT (OUTPATIENT)
Dept: INTERNAL MEDICINE CLINIC | Facility: CLINIC | Age: 83
End: 2018-10-25
Payer: COMMERCIAL

## 2018-10-25 VITALS
WEIGHT: 165.4 LBS | HEART RATE: 75 BPM | DIASTOLIC BLOOD PRESSURE: 89 MMHG | BODY MASS INDEX: 31.23 KG/M2 | OXYGEN SATURATION: 95 % | SYSTOLIC BLOOD PRESSURE: 153 MMHG | TEMPERATURE: 97.6 F | HEIGHT: 61 IN

## 2018-10-25 DIAGNOSIS — Z23 NEEDS FLU SHOT: ICD-10-CM

## 2018-10-25 DIAGNOSIS — I10 BENIGN ESSENTIAL HYPERTENSION: ICD-10-CM

## 2018-10-25 DIAGNOSIS — Z00.00 ENCOUNTER FOR MEDICARE ANNUAL WELLNESS EXAM: Primary | ICD-10-CM

## 2018-10-25 DIAGNOSIS — E55.9 VITAMIN D DEFICIENCY: ICD-10-CM

## 2018-10-25 DIAGNOSIS — R00.2 PALPITATIONS: ICD-10-CM

## 2018-10-25 DIAGNOSIS — E03.9 ACQUIRED HYPOTHYROIDISM: ICD-10-CM

## 2018-10-25 PROCEDURE — 1170F FXNL STATUS ASSESSED: CPT

## 2018-10-25 PROCEDURE — 1125F AMNT PAIN NOTED PAIN PRSNT: CPT

## 2018-10-25 PROCEDURE — G0439 PPPS, SUBSEQ VISIT: HCPCS | Performed by: INTERNAL MEDICINE

## 2018-10-25 PROCEDURE — G0008 ADMIN INFLUENZA VIRUS VAC: HCPCS

## 2018-10-25 PROCEDURE — 4040F PNEUMOC VAC/ADMIN/RCVD: CPT

## 2018-10-25 PROCEDURE — 90662 IIV NO PRSV INCREASED AG IM: CPT

## 2018-10-25 PROCEDURE — 1160F RVW MEDS BY RX/DR IN RCRD: CPT | Performed by: INTERNAL MEDICINE

## 2018-10-25 PROCEDURE — 99214 OFFICE O/P EST MOD 30 MIN: CPT | Performed by: INTERNAL MEDICINE

## 2018-10-25 PROCEDURE — 1160F RVW MEDS BY RX/DR IN RCRD: CPT

## 2018-10-25 RX ORDER — MULTIVIT-MIN/IRON/FOLIC ACID/K 18-600-40
2000 CAPSULE ORAL DAILY
Qty: 30 CAPSULE | Refills: 5
Start: 2018-10-25

## 2018-10-25 NOTE — PATIENT INSTRUCTIONS
Problem List Items Addressed This Visit        Endocrine    Acquired hypothyroidism     Thyroid function test normal, continue current dose of levothyroxine at 112 mcg daily            Cardiovascular and Mediastinum    Benign essential hypertension     Slightly elevated today at 153/89, continue medications  Continue with healthy diet, regular exercise, and pursue stress relieving activities, as these will all help lower blood pressure  Other    Palpitations     Patient does not feel palpitations, but ectopy present on exam         Vitamin D deficiency     Continue vitamin-D         Relevant Medications    Cholecalciferol (VITAMIN D) 2000 units CAPS    Encounter for Medicare annual wellness exam - Primary     Discussed preventative health, cancer screening, immunizations, and safety issues  I recommend getting the Shingrix shot to help prevent Shingles  You can get it a pharmacy, and they can administer it there  It is a two shot series with the second shot needed between 2-6 months after the first shot  I would not recommend getting the shot before an important or fun event in case you were to have a reaction to the shot like a sore arm or flu-like symptoms  I make the same recommendation about any shot, as people can have a reaction to any shot             Other Visit Diagnoses     Needs flu shot        Relevant Orders    influenza vaccine, 9538-2441, high-dose, PF 0 5 mL, for patients 65 yr+ (FLUZONE HIGH-DOSE)

## 2018-10-25 NOTE — ASSESSMENT & PLAN NOTE
Slightly elevated today at 153/89, continue medications  Continue with healthy diet, regular exercise, and pursue stress relieving activities, as these will all help lower blood pressure

## 2018-10-25 NOTE — PROGRESS NOTES
Assessment/Plan:    Encounter for Medicare annual wellness exam  Discussed preventative health, cancer screening, immunizations, and safety issues  I recommend getting the Shingrix shot to help prevent Shingles  You can get it a pharmacy, and they can administer it there  It is a two shot series with the second shot needed between 2-6 months after the first shot  I would not recommend getting the shot before an important or fun event in case you were to have a reaction to the shot like a sore arm or flu-like symptoms  I make the same recommendation about any shot, as people can have a reaction to any shot  Vitamin D deficiency  Continue vitamin-D    Mixed hyperlipidemia  Continue with healthy diet and exercise  Benign essential hypertension  Slightly elevated today at 153/89, continue medications  Continue with healthy diet, regular exercise, and pursue stress relieving activities, as these will all help lower blood pressure  Acquired hypothyroidism  Thyroid function test normal, continue current dose of levothyroxine at 112 mcg daily    Palpitations  Patient does not feel palpitations, but ectopy present on exam       Diagnoses and all orders for this visit:    Encounter for Medicare annual wellness exam    Vitamin D deficiency  -     Cholecalciferol (VITAMIN D) 2000 units CAPS; Take 1 capsule (2,000 Units total) by mouth daily    Needs flu shot  -     influenza vaccine, 7386-2153, high-dose, PF 0 5 mL, for patients 65 yr+ (FLUZONE HIGH-DOSE)    Benign essential hypertension    Acquired hypothyroidism    Palpitations          Subjective:      Patient ID: Jolie Malin Case is a 80 y o  female  Hypothyroidism:  Patient does admit to some fatigue and intermittent constipation, mild cold intolerance, no depression    She reports compliance with the levothyroxine 112 mcg daily, and recent thyroid function tests were normal    Hypertension:  Patient tolerating blood pressure meds, her beta-blocker was switched due to loosen a shins which have resolved  Vitamin-D deficiency:  Recent vitamin-D was still low at 18 9        The following portions of the patient's history were reviewed and updated as appropriate: allergies, current medications, past family history, past medical history, past social history, past surgical history and problem list     Review of Systems   Constitutional: Positive for fatigue  Negative for chills and fever  HENT: Negative for congestion, nosebleeds, postnasal drip, sore throat and trouble swallowing  Eyes: Negative for pain  Respiratory: Negative for cough, chest tightness, shortness of breath and wheezing  Cardiovascular: Negative for chest pain, palpitations and leg swelling  Gastrointestinal: Positive for constipation  Negative for abdominal pain, diarrhea, nausea and vomiting  Endocrine: Positive for cold intolerance  Negative for polydipsia and polyuria  Genitourinary: Negative for dysuria, flank pain and hematuria  Musculoskeletal: Negative for arthralgias  Skin: Negative for rash  Neurological: Negative for dizziness, tremors and headaches  Hematological: Does not bruise/bleed easily  Psychiatric/Behavioral: Negative for confusion and dysphoric mood  The patient is not nervous/anxious  Objective:      /89   Pulse 75   Temp 97 6 °F (36 4 °C)   Ht 5' 1" (1 549 m)   Wt 75 kg (165 lb 6 4 oz)   SpO2 95%   BMI 31 25 kg/m²          Physical Exam   Constitutional: She is oriented to person, place, and time  She appears well-developed and well-nourished  No distress  HENT:   Head: Normocephalic and atraumatic  Right Ear: External ear normal    Left Ear: External ear normal    Eyes: No scleral icterus  Conjunctiva red   Neck: Normal range of motion  Neck supple  No tracheal deviation present  No thyromegaly present  Cardiovascular: Normal rate, regular rhythm and normal heart sounds  Frequent extrasystoles are present     No murmur heard   Pulmonary/Chest: Effort normal and breath sounds normal  No respiratory distress  She has no wheezes  She has no rales  Abdominal: Soft  Bowel sounds are normal  There is no tenderness  There is no rebound and no guarding  Musculoskeletal: She exhibits no edema  Lymphadenopathy:     She has no cervical adenopathy  Neurological: She is alert and oriented to person, place, and time  Psychiatric: She has a normal mood and affect  Her behavior is normal  Judgment and thought content normal    Vitals reviewed

## 2018-10-25 NOTE — PROGRESS NOTES
Assessment and Plan:    Problem List Items Addressed This Visit     None        Health Maintenance Due   Topic Date Due    DTaP,Tdap,and Td Vaccines (1 - Tdap) 1943    INFLUENZA VACCINE  2018         HPI:  Chava Jj Case is a 80 y o  female here for her Subsequent Wellness Visit  Patient Active Problem List   Diagnosis    Benign essential hypertension    Hypothyroidism    Mixed hyperlipidemia    Palpitations    Hallucinations, visual    Vitamin D deficiency     No past medical history on file  Past Surgical History:   Procedure Laterality Date    APPENDECTOMY       SECTION      GALLBLADDER SURGERY       Family History   Problem Relation Age of Onset    Lung cancer Mother     Diabetes Father         mellitus    Atrial fibrillation Son      History   Smoking Status    Former Smoker   Smokeless Tobacco    Not on file     Comment: never  smoker ( as per allscripts)     History   Alcohol Use No      History   Drug Use No       Current Outpatient Prescriptions   Medication Sig Dispense Refill    carvedilol (COREG) 6 25 mg tablet Take 1 tablet (6 25 mg total) by mouth 2 (two) times a day with meals 180 tablet 1    ergocalciferol (VITAMIN D2) 50,000 units Take 1 capsule (50,000 Units total) by mouth once a week 4 capsule 3    fluticasone (FLONASE) 50 mcg/act nasal spray 1 spray into each nostril daily      ipratropium (ATROVENT) 0 03 % nasal spray 2 sprays into each nostril 3 (three) times a day as needed      levothyroxine 112 mcg tablet Take 1 tablet by mouth      losartan (COZAAR) 50 mg tablet Take 1 tablet (50 mg total) by mouth daily 90 tablet 3    terbinafine (LamISIL) 250 mg tablet Take 1 tablet by mouth daily       No current facility-administered medications for this visit        No Known Allergies  Immunization History   Administered Date(s) Administered    Influenza 10/13/2017    Influenza Split High Dose Preservative Free IM 2012, 10/16/2013, 10/27/2014, 11/11/2015, 10/08/2016, 10/13/2017    Influenza TIV (IM) 09/28/2010, 10/14/2011    Pneumococcal Conjugate 13-Valent 06/05/2017    Pneumococcal Polysaccharide PPV23 11/02/1999       Patient Care Team:  Carlene Fontenot MD as PCP - General  MD Flaca Choudhury MD    Medicare Screening Tests and Risk Assessments:  Kait Crawford is here for her Subsequent Wellness visit  Health Risk Assessment:  Patient rates overall health as very good  Patient feels that their physical health rating is Same  Eyesight was rated as Same  Hearing was rated as Same  Patient feels that their emotional and mental health rating is Same  Pain experienced by patient in the last 7 days has been None  Patient states that she has experienced no weight loss or gain in last 6 months  Emotional/Mental Health:  Patient has been feeling nervous/anxious  PHQ-9 Depression Screening:    Frequency of the following problems over the past two weeks:      1  Little interest or pleasure in doing things: 0 - not at all      2  Feeling down, depressed, or hopeless: 0 - not at all  PHQ-2 Score: 0          Broken Bones/Falls: Fall Risk Assessment:    In the past year, patient has experienced: No history of falling in past year          Bladder/Bowel:  Patient has not leaked urine accidently in the last six months  Patient reports no loss of bowel control  Immunizations:  Patient has had a flu vaccination within the last year  Patient has received a pneumonia shot  Patient has not received a shingles shot  Patient has not received tetanus/diphtheria shot  (Additional Comments: I recommend a tetanus booster if she were to have a puncture wound)    Home Safety:  Patient does not have trouble with stairs inside or outside of their home  Patient currently reports that there are no safety hazards present in home, working smoke alarms, working carbon monoxide detectors        Preventative Screenings:   No breast cancer screening performed, no colon cancer screen completed, cholesterol screen completed, 5/4/2017  glaucoma eye exam completed, 4/3/2018      Nutrition:  Current diet: Regular with servings of the following:    Medications:  Patient is currently taking over-the-counter supplements  List of OTC medications includes: vitamins  Patient is able to manage medications  Lifestyle Choices:  Patient reports no tobacco use  Patient has smoked or used tobacco in the past   Patient has stopped her tobacco use  Tobacco use quit date: 1962  Patient reports no alcohol use  Patient does not drive a vehicle  Patient wears seat belt  Current level of exercise of physical activity described by patient as: walking  Activities of Daily Living:  Can get out of bed by his or her self, able to dress self, able to make own meals, able to do own shopping, able to bathe self, unable to do laundry/housekeeping, unable to manage own money and other related tasks    Previous Hospitalizations:  No hospitalization or ED visit in past 12 months        Advanced Directives:  Patient has decided on a power of   Patient has spoken to designated power of   Patient has completed advanced directive          Preventative Screening/Counseling:      Cardiovascular:      General: Risks and Benefits Discussed and Screening Current          Diabetes:      General: Risks and Benefits Discussed and Screening Current          Colorectal Cancer:      General: Risks and Benefits Discussed and Screening Not Indicated          Breast Cancer:      General: Risks and Benefits Discussed          Hepatitis C:      General: Risks and Benefits Discussed and Screening Not Indicated        Immunizations:      Influenza: Risks & Benefits Discussed, Influenza Due Today and Influenza Recommended Annually      Pneumococcal: Risks & Benefits Discussed and Lifetime Vaccine Completed      Shingrix: Risks & Benefits Discussed      TDAP: Risks & Benefits Discussed and Vaccine Status Unknown      Other Preventative Counseling (Non-Medicare):  Car/seat belt/driving safety reviewed, Skin self-exam and Sunscreen use

## 2018-12-03 ENCOUNTER — TELEPHONE (OUTPATIENT)
Dept: INTERNAL MEDICINE CLINIC | Facility: CLINIC | Age: 83
End: 2018-12-03

## 2018-12-03 DIAGNOSIS — E03.9 ACQUIRED HYPOTHYROIDISM: Primary | ICD-10-CM

## 2018-12-03 RX ORDER — LEVOTHYROXINE SODIUM 112 UG/1
112 TABLET ORAL DAILY
Qty: 90 TABLET | Refills: 3 | Status: SHIPPED | OUTPATIENT
Start: 2018-12-03 | End: 2019-11-20 | Stop reason: SDUPTHER

## 2019-02-01 DIAGNOSIS — I10 HTN (HYPERTENSION), BENIGN: ICD-10-CM

## 2019-02-01 RX ORDER — CARVEDILOL 6.25 MG/1
6.25 TABLET ORAL 2 TIMES DAILY WITH MEALS
Qty: 180 TABLET | Refills: 3 | Status: SHIPPED | OUTPATIENT
Start: 2019-02-01 | End: 2019-02-20 | Stop reason: SDUPTHER

## 2019-02-20 ENCOUNTER — OFFICE VISIT (OUTPATIENT)
Dept: INTERNAL MEDICINE CLINIC | Facility: CLINIC | Age: 84
End: 2019-02-20
Payer: COMMERCIAL

## 2019-02-20 VITALS
WEIGHT: 164 LBS | HEART RATE: 72 BPM | OXYGEN SATURATION: 98 % | HEIGHT: 61 IN | BODY MASS INDEX: 30.96 KG/M2 | SYSTOLIC BLOOD PRESSURE: 144 MMHG | DIASTOLIC BLOOD PRESSURE: 92 MMHG

## 2019-02-20 DIAGNOSIS — E78.2 MIXED HYPERLIPIDEMIA: ICD-10-CM

## 2019-02-20 DIAGNOSIS — E03.9 ACQUIRED HYPOTHYROIDISM: ICD-10-CM

## 2019-02-20 DIAGNOSIS — E55.9 VITAMIN D DEFICIENCY: ICD-10-CM

## 2019-02-20 DIAGNOSIS — I10 HTN (HYPERTENSION), BENIGN: ICD-10-CM

## 2019-02-20 DIAGNOSIS — R44.1 HALLUCINATIONS, VISUAL: ICD-10-CM

## 2019-02-20 DIAGNOSIS — I10 BENIGN ESSENTIAL HYPERTENSION: Primary | ICD-10-CM

## 2019-02-20 DIAGNOSIS — R00.2 PALPITATIONS: ICD-10-CM

## 2019-02-20 PROCEDURE — 1160F RVW MEDS BY RX/DR IN RCRD: CPT | Performed by: INTERNAL MEDICINE

## 2019-02-20 PROCEDURE — 99214 OFFICE O/P EST MOD 30 MIN: CPT | Performed by: INTERNAL MEDICINE

## 2019-02-20 PROCEDURE — 1036F TOBACCO NON-USER: CPT | Performed by: INTERNAL MEDICINE

## 2019-02-20 RX ORDER — CARVEDILOL 12.5 MG/1
12.5 TABLET ORAL 2 TIMES DAILY WITH MEALS
Qty: 180 TABLET | Refills: 3 | Status: SHIPPED | OUTPATIENT
Start: 2019-02-20 | End: 2020-02-17 | Stop reason: ALTCHOICE

## 2019-02-20 NOTE — PATIENT INSTRUCTIONS
Problem List Items Addressed This Visit        Endocrine    Acquired hypothyroidism     Continue levothyroxine will check thyroid function test before next visit  Relevant Medications    carvedilol (COREG) 12 5 mg tablet    Other Relevant Orders    TSH, 3rd generation with Free T4 reflex       Cardiovascular and Mediastinum    Benign essential hypertension - Primary     Blood pressure still a little elevated, I recommend increasing carvedilol from 6 25 mg twice a day to 12 5 mg twice a day         Relevant Medications    carvedilol (COREG) 12 5 mg tablet       Other    Mixed hyperlipidemia     Continue to stay active, and continue healthy diet           Relevant Orders    CBC and differential    Comprehensive metabolic panel    Lipid Panel with Direct LDL reflex    Palpitations     Continue carvedilol and will recheck levothyroxine before next appointment         Hallucinations, visual     These resolved after switching patient from metoprolol to carvedilol         Vitamin D deficiency     Continue vitamin-D         Relevant Orders    Vitamin D 25 hydroxy      Other Visit Diagnoses     HTN (hypertension), benign        Relevant Medications    carvedilol (COREG) 12 5 mg tablet

## 2019-02-20 NOTE — PROGRESS NOTES
Assessment/Plan:    Mixed hyperlipidemia  Continue to stay active, and continue healthy diet  Benign essential hypertension  Blood pressure still a little elevated, I recommend increasing carvedilol from 6 25 mg twice a day to 12 5 mg twice a day    Hallucinations, visual  These resolved after switching patient from metoprolol to carvedilol    Palpitations  Continue carvedilol and will recheck levothyroxine before next appointment    Vitamin D deficiency  Continue vitamin-D    Acquired hypothyroidism  Continue levothyroxine will check thyroid function test before next visit  Diagnoses and all orders for this visit:    Benign essential hypertension    Mixed hyperlipidemia  -     CBC and differential; Future  -     Comprehensive metabolic panel; Future  -     Lipid Panel with Direct LDL reflex; Future    HTN (hypertension), benign  -     carvedilol (COREG) 12 5 mg tablet; Take 1 tablet (12 5 mg total) by mouth 2 (two) times a day with meals    Hallucinations, visual    Palpitations    Vitamin D deficiency  -     Vitamin D 25 hydroxy; Future    Acquired hypothyroidism  -     TSH, 3rd generation with Free T4 reflex; Future          Subjective:      Patient ID: Deanna Valadez Case is a 80 y o  female  Hypothyroidism:  Patient reports compliance with thyroid med, no excessive fatigue, no constipation, no cold intolerance, no depression  Hypertension:  Patient reports compliance with medications, she does get some occasional palpitations, but they are not bothersome  Visual hallucinations:  Patient not currently having any of these  Vitamin-D deficiency:  Patient reports compliance with vitamin-D      The following portions of the patient's history were reviewed and updated as appropriate: allergies, current medications, past family history, past medical history, past social history, past surgical history and problem list     Review of Systems   Constitutional: Negative for chills, fatigue and fever  HENT: Negative for congestion, nosebleeds, postnasal drip, sore throat and trouble swallowing  Eyes: Negative for pain  Respiratory: Negative for cough, chest tightness, shortness of breath and wheezing  Cardiovascular: Negative for chest pain, palpitations and leg swelling  Gastrointestinal: Negative for abdominal pain, constipation, diarrhea, nausea and vomiting  Endocrine: Negative for polydipsia and polyuria  Genitourinary: Negative for dysuria, flank pain and hematuria  Musculoskeletal: Negative for arthralgias  Skin: Negative for rash  Neurological: Negative for dizziness, tremors and headaches  Hematological: Does not bruise/bleed easily  Psychiatric/Behavioral: Negative for confusion and dysphoric mood  The patient is not nervous/anxious  Objective:      /92   Pulse 72   Ht 5' 1" (1 549 m)   Wt 74 4 kg (164 lb)   SpO2 98%   BMI 30 99 kg/m²          Physical Exam   Constitutional: She is oriented to person, place, and time  She appears well-developed and well-nourished  No distress  HENT:   Head: Normocephalic and atraumatic  Right Ear: External ear normal    Left Ear: External ear normal    Eyes: No scleral icterus  Conjunctiva red (chronic)   Neck: Normal range of motion  Neck supple  No tracheal deviation present  No thyromegaly present  Cardiovascular: Normal rate, regular rhythm and normal heart sounds  No murmur heard  Pulmonary/Chest: Effort normal and breath sounds normal  No respiratory distress  She has no wheezes  She has no rales  Abdominal: Soft  Bowel sounds are normal  There is no tenderness  There is no rebound and no guarding  Musculoskeletal: She exhibits no edema  Lymphadenopathy:     She has no cervical adenopathy  Neurological: She is alert and oriented to person, place, and time  Psychiatric: She has a normal mood and affect  Her behavior is normal  Judgment and thought content normal    Vitals reviewed

## 2019-03-26 ENCOUNTER — HOSPITAL ENCOUNTER (EMERGENCY)
Facility: HOSPITAL | Age: 84
Discharge: HOME | End: 2019-03-26
Attending: EMERGENCY MEDICINE | Admitting: EMERGENCY MEDICINE
Payer: COMMERCIAL

## 2019-03-26 ENCOUNTER — APPOINTMENT (EMERGENCY)
Dept: CT IMAGING | Facility: HOSPITAL | Age: 84
End: 2019-03-26
Payer: COMMERCIAL

## 2019-03-26 VITALS
WEIGHT: 163.14 LBS | TEMPERATURE: 97.6 F | BODY MASS INDEX: 30.83 KG/M2 | SYSTOLIC BLOOD PRESSURE: 148 MMHG | RESPIRATION RATE: 18 BRPM | HEART RATE: 62 BPM | DIASTOLIC BLOOD PRESSURE: 60 MMHG | OXYGEN SATURATION: 97 %

## 2019-03-26 DIAGNOSIS — R19.7 DIARRHEA, UNSPECIFIED TYPE: Primary | ICD-10-CM

## 2019-03-26 DIAGNOSIS — R55 NEAR SYNCOPE: ICD-10-CM

## 2019-03-26 LAB
ABO GROUP BLD: NORMAL
ALBUMIN SERPL BCP-MCNC: 3 G/DL (ref 3.5–5)
ALP SERPL-CCNC: 86 U/L (ref 46–116)
ALT SERPL W P-5'-P-CCNC: 24 U/L (ref 12–78)
ANION GAP SERPL CALCULATED.3IONS-SCNC: 9 MMOL/L (ref 4–13)
APTT PPP: 25 SECONDS (ref 26–38)
AST SERPL W P-5'-P-CCNC: 22 U/L (ref 5–45)
BASOPHILS # BLD AUTO: 0.03 THOUSANDS/ΜL (ref 0–0.1)
BASOPHILS NFR BLD AUTO: 0 % (ref 0–1)
BILIRUB SERPL-MCNC: 0.9 MG/DL (ref 0.2–1)
BLD GP AB SCN SERPL QL: NEGATIVE
BUN SERPL-MCNC: 19 MG/DL (ref 5–25)
CALCIUM SERPL-MCNC: 9.4 MG/DL (ref 8.3–10.1)
CHLORIDE SERPL-SCNC: 107 MMOL/L (ref 100–108)
CO2 SERPL-SCNC: 26 MMOL/L (ref 21–32)
CREAT SERPL-MCNC: 1 MG/DL (ref 0.6–1.3)
EOSINOPHIL # BLD AUTO: 0.1 THOUSAND/ΜL (ref 0–0.61)
EOSINOPHIL NFR BLD AUTO: 1 % (ref 0–6)
ERYTHROCYTE [DISTWIDTH] IN BLOOD BY AUTOMATED COUNT: 14.6 % (ref 11.6–15.1)
GFR SERPL CREATININE-BSD FRML MDRD: 48 ML/MIN/1.73SQ M
GLUCOSE SERPL-MCNC: 112 MG/DL (ref 65–140)
HCT VFR BLD AUTO: 36.1 % (ref 34.8–46.1)
HGB BLD-MCNC: 11.2 G/DL (ref 11.5–15.4)
IMM GRANULOCYTES # BLD AUTO: 0.02 THOUSAND/UL (ref 0–0.2)
IMM GRANULOCYTES NFR BLD AUTO: 0 % (ref 0–2)
INR PPP: 1.07 (ref 0.86–1.17)
LIPASE SERPL-CCNC: 97 U/L (ref 73–393)
LYMPHOCYTES # BLD AUTO: 1.74 THOUSANDS/ΜL (ref 0.6–4.47)
LYMPHOCYTES NFR BLD AUTO: 23 % (ref 14–44)
MCH RBC QN AUTO: 29.2 PG (ref 26.8–34.3)
MCHC RBC AUTO-ENTMCNC: 31 G/DL (ref 31.4–37.4)
MCV RBC AUTO: 94 FL (ref 82–98)
MONOCYTES # BLD AUTO: 0.48 THOUSAND/ΜL (ref 0.17–1.22)
MONOCYTES NFR BLD AUTO: 6 % (ref 4–12)
NEUTROPHILS # BLD AUTO: 5.16 THOUSANDS/ΜL (ref 1.85–7.62)
NEUTS SEG NFR BLD AUTO: 70 % (ref 43–75)
NRBC BLD AUTO-RTO: 0 /100 WBCS
PLATELET # BLD AUTO: 199 THOUSANDS/UL (ref 149–390)
PMV BLD AUTO: 9.6 FL (ref 8.9–12.7)
POTASSIUM SERPL-SCNC: 4.5 MMOL/L (ref 3.5–5.3)
PROT SERPL-MCNC: 6.6 G/DL (ref 6.4–8.2)
PROTHROMBIN TIME: 13.6 SECONDS (ref 11.8–14.2)
RBC # BLD AUTO: 3.83 MILLION/UL (ref 3.81–5.12)
RH BLD: POSITIVE
SODIUM SERPL-SCNC: 142 MMOL/L (ref 136–145)
SPECIMEN EXPIRATION DATE: NORMAL
TROPONIN I SERPL-MCNC: <0.02 NG/ML
WBC # BLD AUTO: 7.53 THOUSAND/UL (ref 4.31–10.16)

## 2019-03-26 PROCEDURE — 85025 COMPLETE CBC W/AUTO DIFF WBC: CPT | Performed by: PHYSICIAN ASSISTANT

## 2019-03-26 PROCEDURE — 80053 COMPREHEN METABOLIC PANEL: CPT | Performed by: PHYSICIAN ASSISTANT

## 2019-03-26 PROCEDURE — 85610 PROTHROMBIN TIME: CPT | Performed by: PHYSICIAN ASSISTANT

## 2019-03-26 PROCEDURE — 86850 RBC ANTIBODY SCREEN: CPT | Performed by: PHYSICIAN ASSISTANT

## 2019-03-26 PROCEDURE — 82272 OCCULT BLD FECES 1-3 TESTS: CPT

## 2019-03-26 PROCEDURE — 84484 ASSAY OF TROPONIN QUANT: CPT | Performed by: PHYSICIAN ASSISTANT

## 2019-03-26 PROCEDURE — 99284 EMERGENCY DEPT VISIT MOD MDM: CPT

## 2019-03-26 PROCEDURE — 93005 ELECTROCARDIOGRAM TRACING: CPT

## 2019-03-26 PROCEDURE — 96360 HYDRATION IV INFUSION INIT: CPT

## 2019-03-26 PROCEDURE — 86900 BLOOD TYPING SEROLOGIC ABO: CPT | Performed by: PHYSICIAN ASSISTANT

## 2019-03-26 PROCEDURE — 74177 CT ABD & PELVIS W/CONTRAST: CPT

## 2019-03-26 PROCEDURE — 36415 COLL VENOUS BLD VENIPUNCTURE: CPT | Performed by: PHYSICIAN ASSISTANT

## 2019-03-26 PROCEDURE — 86901 BLOOD TYPING SEROLOGIC RH(D): CPT | Performed by: PHYSICIAN ASSISTANT

## 2019-03-26 PROCEDURE — 85730 THROMBOPLASTIN TIME PARTIAL: CPT | Performed by: PHYSICIAN ASSISTANT

## 2019-03-26 PROCEDURE — 83690 ASSAY OF LIPASE: CPT | Performed by: PHYSICIAN ASSISTANT

## 2019-03-26 RX ADMIN — SODIUM CHLORIDE 500 ML: 0.9 INJECTION, SOLUTION INTRAVENOUS at 13:14

## 2019-03-26 RX ADMIN — IODIXANOL 100 ML: 320 INJECTION, SOLUTION INTRAVASCULAR at 13:49

## 2019-03-26 NOTE — ED NOTES
Walked PT around ER with her cane  PT was stable and able to walk       Ana Mariaalex Rough  03/26/19 [id number]

## 2019-03-26 NOTE — ED PROVIDER NOTES
History  Chief Complaint   Patient presents with    Diarrhea     pt states she woke up around 0300 and has been having multiple episodes of diarrhea  pt daughter n law found blood in the toilet  this morning pt has an approx 5 min episode where she was "lethargic and breathing heavy"  pt was not responding to her son  pt son called 911 by the time ambulance go there she was "better"     71-year-old female presents to the emergency department with complaints of diarrhea  Son states she has had numerous episodes of diarrhea since 3:00 a m  Samira Kaushal Daughter-in-law states she also thinks she may have seen blood in the toilet this morning  Patient presently denies any abdominal pain discomfort  Son has concerned that this morning she had a episode lasting approximately 5 minutes where she was lethargic and breathing heavy but did not respond to his voice  States that her mother's over the time that she complained of nausea just before that  States that he called EMS but the patient seemed to return to her normal baseline prior to them arriving  No history of similar symptoms  Does not take any blood thinners  History provided by:  Patient and relative   used: No        Prior to Admission Medications   Prescriptions Last Dose Informant Patient Reported? Taking? Cholecalciferol (VITAMIN D) 2000 units CAPS 3/26/2019 at Unknown time  No Yes   Sig: Take 1 capsule (2,000 Units total) by mouth daily   carvedilol (COREG) 12 5 mg tablet 3/26/2019 at Unknown time  No Yes   Sig: Take 1 tablet (12 5 mg total) by mouth 2 (two) times a day with meals   levothyroxine 112 mcg tablet 3/26/2019 at Unknown time  No Yes   Sig: Take 1 tablet (112 mcg total) by mouth daily   losartan (COZAAR) 50 mg tablet 3/26/2019 at Unknown time  No Yes   Sig: Take 1 tablet (50 mg total) by mouth daily      Facility-Administered Medications: None       No past medical history on file      Past Surgical History:   Procedure Laterality Date    APPENDECTOMY       SECTION      GALLBLADDER SURGERY         Family History   Problem Relation Age of Onset    Lung cancer Mother     Diabetes Father         mellitus    Atrial fibrillation Son      I have reviewed and agree with the history as documented  Social History     Tobacco Use    Smoking status: Former Smoker    Tobacco comment: never  smoker ( as per allscripts)   Substance Use Topics    Alcohol use: No    Drug use: No        Review of Systems   Constitutional: Negative for activity change, appetite change, chills and fever  HENT: Negative for congestion, dental problem, drooling, ear discharge, ear pain, mouth sores, nosebleeds, rhinorrhea, sore throat and trouble swallowing  Eyes: Negative for pain, discharge and itching  Respiratory: Negative for cough, chest tightness, shortness of breath and wheezing  Cardiovascular: Negative for chest pain and palpitations  Gastrointestinal: Positive for diarrhea and nausea  Negative for abdominal pain, blood in stool, constipation and vomiting  Endocrine: Negative for cold intolerance and heat intolerance  Genitourinary: Negative for difficulty urinating, dysuria, flank pain, frequency and urgency  Skin: Negative for rash and wound  Allergic/Immunologic: Negative for food allergies and immunocompromised state  Neurological: Negative for dizziness, seizures, syncope, weakness, numbness and headaches  Near syncope     Psychiatric/Behavioral: Negative for agitation, behavioral problems and confusion  Physical Exam  Physical Exam   Constitutional: She is oriented to person, place, and time  She appears well-developed and well-nourished  No distress  HENT:   Head: Normocephalic and atraumatic  Right Ear: External ear normal    Left Ear: External ear normal    Mouth/Throat: Oropharynx is clear and moist  No oropharyngeal exudate  Eyes: Conjunctivae are normal    Neck: No JVD present   No tracheal deviation present  Cardiovascular: Normal rate, regular rhythm and normal heart sounds  Exam reveals no gallop and no friction rub  No murmur heard  Pulmonary/Chest: Effort normal and breath sounds normal  No respiratory distress  She has no wheezes  She has no rales  She exhibits no tenderness  Abdominal: Soft  Bowel sounds are normal  She exhibits no distension  There is no tenderness  There is no guarding  Genitourinary: Rectal exam shows guaiac positive stool  Musculoskeletal: Normal range of motion  She exhibits no edema, tenderness or deformity  Lymphadenopathy:     She has no cervical adenopathy  Neurological: She is alert and oriented to person, place, and time  Skin: Skin is warm and dry  No rash noted  She is not diaphoretic  No erythema  Psychiatric: She has a normal mood and affect  Her behavior is normal    Nursing note and vitals reviewed        Vital Signs  ED Triage Vitals [03/26/19 1219]   Temperature Pulse Respirations Blood Pressure SpO2   97 6 °F (36 4 °C) 88 16 121/56 95 %      Temp Source Heart Rate Source Patient Position - Orthostatic VS BP Location FiO2 (%)   Oral Monitor Lying Right arm --      Pain Score       --           Vitals:    03/26/19 1256 03/26/19 1259 03/26/19 1302 03/26/19 1320   BP: 132/69 155/70 162/70 154/67   Pulse: 71 74 82 76   Patient Position - Orthostatic VS: Lying - Orthostatic VS Sitting - Orthostatic VS Standing for 3 minutes - Orthostatic VS Lying         Visual Acuity      ED Medications  Medications   sodium chloride 0 9 % bolus 500 mL (500 mL Intravenous New Bag 3/26/19 1314)   iodixanol (VISIPAQUE) 320 MG/ML injection 100 mL (100 mL Intravenous Given 3/26/19 1349)       Diagnostic Studies  Results Reviewed     Procedure Component Value Units Date/Time    Troponin I [[id number]]  (Normal) Collected:  03/26/19 1251    Lab Status:  Final result Specimen:  Blood from Arm, Right Updated:  03/26/19 1317     Troponin I <0 02 ng/mL     Lipase [[id number]]  (Normal) Collected:  03/26/19 1251    Lab Status:  Final result Specimen:  Blood from Arm, Right Updated:  03/26/19 1315     Lipase 97 u/L     Comprehensive metabolic panel [[id number]]  (Abnormal) Collected:  03/26/19 1251    Lab Status:  Final result Specimen:  Blood from Arm, Right Updated:  03/26/19 1315     Sodium 142 mmol/L      Potassium 4 5 mmol/L      Chloride 107 mmol/L      CO2 26 mmol/L      ANION GAP 9 mmol/L      BUN 19 mg/dL      Creatinine 1 00 mg/dL      Glucose 112 mg/dL      Calcium 9 4 mg/dL      AST 22 U/L      ALT 24 U/L      Alkaline Phosphatase 86 U/L      Total Protein 6 6 g/dL      Albumin 3 0 g/dL      Total Bilirubin 0 90 mg/dL      eGFR 48 ml/min/1 73sq m     Narrative:       National Kidney Disease Education Program recommendations are as follows:  GFR calculation is accurate only with a steady state creatinine  Chronic Kidney disease less than 60 ml/min/1 73 sq  meters  Kidney failure less than 15 ml/min/1 73 sq  meters      Protime-INR [[id number]]  (Normal) Collected:  03/26/19 1251    Lab Status:  Final result Specimen:  Blood from Arm, Right Updated:  03/26/19 1313     Protime 13 6 seconds      INR 1 07    APTT [[id number]]  (Abnormal) Collected:  03/26/19 1251    Lab Status:  Final result Specimen:  Blood from Arm, Right Updated:  03/26/19 1313     PTT 25 seconds     CBC and differential [[id number]]  (Abnormal) Collected:  03/26/19 1251    Lab Status:  Final result Specimen:  Blood from Arm, Right Updated:  03/26/19 1302     WBC 7 53 Thousand/uL      RBC 3 83 Million/uL      Hemoglobin 11 2 g/dL      Hematocrit 36 1 %      MCV 94 fL      MCH 29 2 pg      MCHC 31 0 g/dL      RDW 14 6 %      MPV 9 6 fL      Platelets 935 Thousands/uL      nRBC 0 /100 WBCs      Neutrophils Relative 70 %      Immat GRANS % 0 %      Lymphocytes Relative 23 %      Monocytes Relative 6 %      Eosinophils Relative 1 %      Basophils Relative 0 %      Neutrophils Absolute 5 16 Thousands/µL Immature Grans Absolute 0 02 Thousand/uL      Lymphocytes Absolute 1 74 Thousands/µL      Monocytes Absolute 0 48 Thousand/µL      Eosinophils Absolute 0 10 Thousand/µL      Basophils Absolute 0 03 Thousands/µL                  CT abdomen pelvis with contrast   Final Result by Tony Moncada MD (03/26 4360)      1  No acute inflammatory process in the abdomen or pelvis  2   Diverticulosis coli  3   Indeterminate left adrenal nodule, likely an adrenal adenoma  There is clinical concern, 12 month follow-up CT is recommended to      4  Fat-containing paraumbilical hernia  5   Bibasilar small airways disease  Workstation performed: [id number]                    Procedures  ECG 12 Lead Documentation  Date/Time: 3/26/2019 1:09 PM  Performed by: Chas Vázquez PA-C  Authorized by: Chas Vázquez PA-C     Indications / Diagnosis:  Near syncope  ECG reviewed by me, the ED Provider: yes    Patient location:  ED  Previous ECG:     Previous ECG:  Compared to current    Comparison ECG info:  5/8/11    Similarity:  No change    Comparison to cardiac monitor: No    Interpretation:     Interpretation: abnormal    Rate:     ECG rate:  73    ECG rate assessment: normal    Rhythm:     Rhythm: sinus rhythm      Rhythm comment:  With sinus arrhythmia  Ectopy:     Ectopy: none    QRS:     QRS axis:  Normal    QRS intervals:  Normal  Conduction:     Conduction: normal    ST segments:     ST segments:  Normal  T waves:     T waves: normal             Phone Contacts  ED Phone Contact    ED Course  ED Course as of Mar 26 1517   Braulioe Mar 26, 2019   1429 Patient updated  Will continue to wait for CT results  May need to be admitted for colitis due to bloody stool and near-syncope  1516 Discussed test results with patient and family  Patient stable for discharge  Will ambulate in the department to ensure that she is steady on her feet  Advised to follow up with family doctor regarding adrenal adenoma  MDM  Number of Diagnoses or Management Options  Diagnosis management comments: Differential diagnosis includes but not limited to:  Colitis, infectious diarrhea, near syncope, GI bleed  Amount and/or Complexity of Data Reviewed  Clinical lab tests: ordered  Tests in the radiology section of CPT®: ordered        Disposition  Final diagnoses:   Diarrhea, unspecified type   Near syncope     Time reflects when diagnosis was documented in both MDM as applicable and the Disposition within this note     Time User Action Codes Description Comment    3/26/2019  3:16 PM Evan Kahn 26 [R19 7] Diarrhea, unspecified type     3/26/2019  3:17 PM Lulu Kahn Add [R55] Near syncope       ED Disposition     ED Disposition Condition Date/Time Comment    Discharge Stable Tue Mar 26, 2019  3:16 PM Eugene Faustin Case discharge to home/self care  Follow-up Information     Follow up With Specialties Details Why Contact Info    Roxanne House MD Internal Medicine   [facility]  [phone]            Patient's Medications   Discharge Prescriptions    No medications on file     No discharge procedures on file      ED Provider  Electronically Signed by           Janet Hooks PA-C  03/26/19 0801

## 2019-03-27 ENCOUNTER — TELEPHONE (OUTPATIENT)
Dept: INTERNAL MEDICINE CLINIC | Facility: CLINIC | Age: 84
End: 2019-03-27

## 2019-03-27 DIAGNOSIS — R21 RASH: Primary | ICD-10-CM

## 2019-03-27 LAB
ATRIAL RATE: 227 BPM
QRS AXIS: 13 DEGREES
QRSD INTERVAL: 72 MS
QT INTERVAL: 392 MS
QTC INTERVAL: 431 MS
T WAVE AXIS: 80 DEGREES
VENTRICULAR RATE: 73 BPM

## 2019-03-27 PROCEDURE — 93010 ELECTROCARDIOGRAM REPORT: CPT | Performed by: INTERNAL MEDICINE

## 2019-03-27 RX ORDER — NYSTATIN 100000 [USP'U]/G
POWDER TOPICAL 3 TIMES DAILY
Qty: 60 G | Refills: 1 | Status: SHIPPED | OUTPATIENT
Start: 2019-03-27 | End: 2019-06-07 | Stop reason: ALTCHOICE

## 2019-03-27 NOTE — TELEPHONE ENCOUNTER
The patient was in the ER yesterday  They found a rash under her breasts  The son was instructed by the ER to contact you and to request a statin powder  Are you able to prescribe this or would you like to see her? Please advise   Thank you

## 2019-03-27 NOTE — TELEPHONE ENCOUNTER
Makayla Givens called in again said patient was in the ER and all her records should be in her chart and he asked about the statin powder? He asked if he can be called once sent?

## 2019-03-29 ENCOUNTER — TELEPHONE (OUTPATIENT)
Dept: INTERNAL MEDICINE CLINIC | Facility: CLINIC | Age: 84
End: 2019-03-29

## 2019-03-29 NOTE — TELEPHONE ENCOUNTER
I called son and discussed issues with him including episodes of rectal bleeding that resolve, and if worsening, she may need to return to the emergency room, he will closely observe her over the next couple of hours and make a decision  She is not having abdominal pain    ED note reviewed

## 2019-03-30 ENCOUNTER — APPOINTMENT (EMERGENCY)
Dept: RADIOLOGY | Facility: HOSPITAL | Age: 84
DRG: 393 | End: 2019-03-30
Payer: COMMERCIAL

## 2019-03-30 ENCOUNTER — HOSPITAL ENCOUNTER (INPATIENT)
Facility: HOSPITAL | Age: 84
LOS: 1 days | Discharge: HOME | DRG: 393 | End: 2019-04-01
Attending: EMERGENCY MEDICINE | Admitting: HOSPITALIST
Payer: COMMERCIAL

## 2019-03-30 DIAGNOSIS — K92.2 LOWER GI BLEED: Primary | ICD-10-CM

## 2019-03-30 LAB
ABO GROUP BLD: NORMAL
ANION GAP SERPL CALCULATED.3IONS-SCNC: 8 MMOL/L (ref 4–13)
APTT PPP: 27 SECONDS (ref 26–38)
BASOPHILS # BLD AUTO: 0.03 THOUSANDS/ΜL (ref 0–0.1)
BASOPHILS NFR BLD AUTO: 1 % (ref 0–1)
BLD GP AB SCN SERPL QL: NEGATIVE
BUN SERPL-MCNC: 32 MG/DL (ref 5–25)
CALCIUM SERPL-MCNC: 8.6 MG/DL (ref 8.3–10.1)
CHLORIDE SERPL-SCNC: 111 MMOL/L (ref 100–108)
CO2 SERPL-SCNC: 26 MMOL/L (ref 21–32)
CREAT SERPL-MCNC: 0.94 MG/DL (ref 0.6–1.3)
EOSINOPHIL # BLD AUTO: 0.2 THOUSAND/ΜL (ref 0–0.61)
EOSINOPHIL NFR BLD AUTO: 4 % (ref 0–6)
ERYTHROCYTE [DISTWIDTH] IN BLOOD BY AUTOMATED COUNT: 15.1 % (ref 11.6–15.1)
ERYTHROCYTE [DISTWIDTH] IN BLOOD BY AUTOMATED COUNT: 15.1 % (ref 11.6–15.1)
GFR SERPL CREATININE-BSD FRML MDRD: 51 ML/MIN/1.73SQ M
GLUCOSE SERPL-MCNC: 106 MG/DL (ref 65–140)
HCT VFR BLD AUTO: 20.4 % (ref 34.8–46.1)
HCT VFR BLD AUTO: 26.1 % (ref 34.8–46.1)
HGB BLD-MCNC: 6.2 G/DL (ref 11.5–15.4)
HGB BLD-MCNC: 7.8 G/DL (ref 11.5–15.4)
HGB BLD-MCNC: 7.9 G/DL (ref 11.5–15.4)
IMM GRANULOCYTES # BLD AUTO: 0.01 THOUSAND/UL (ref 0–0.2)
IMM GRANULOCYTES NFR BLD AUTO: 0 % (ref 0–2)
INR PPP: 1.27 (ref 0.86–1.17)
LACTATE SERPL-SCNC: 1.9 MMOL/L (ref 0.5–2)
LYMPHOCYTES # BLD AUTO: 1.14 THOUSANDS/ΜL (ref 0.6–4.47)
LYMPHOCYTES NFR BLD AUTO: 21 % (ref 14–44)
MCH RBC QN AUTO: 29.5 PG (ref 26.8–34.3)
MCH RBC QN AUTO: 30.1 PG (ref 26.8–34.3)
MCHC RBC AUTO-ENTMCNC: 30.3 G/DL (ref 31.4–37.4)
MCHC RBC AUTO-ENTMCNC: 30.4 G/DL (ref 31.4–37.4)
MCV RBC AUTO: 97 FL (ref 82–98)
MCV RBC AUTO: 99 FL (ref 82–98)
MONOCYTES # BLD AUTO: 0.28 THOUSAND/ΜL (ref 0.17–1.22)
MONOCYTES NFR BLD AUTO: 5 % (ref 4–12)
NEUTROPHILS # BLD AUTO: 3.76 THOUSANDS/ΜL (ref 1.85–7.62)
NEUTS SEG NFR BLD AUTO: 69 % (ref 43–75)
NRBC BLD AUTO-RTO: 0 /100 WBCS
PLATELET # BLD AUTO: 134 THOUSANDS/UL (ref 149–390)
PLATELET # BLD AUTO: 169 THOUSANDS/UL (ref 149–390)
PMV BLD AUTO: 9.4 FL (ref 8.9–12.7)
PMV BLD AUTO: 9.6 FL (ref 8.9–12.7)
POTASSIUM SERPL-SCNC: 3.7 MMOL/L (ref 3.5–5.3)
PROTHROMBIN TIME: 15.5 SECONDS (ref 11.8–14.2)
RBC # BLD AUTO: 2.06 MILLION/UL (ref 3.81–5.12)
RBC # BLD AUTO: 2.68 MILLION/UL (ref 3.81–5.12)
RH BLD: POSITIVE
SODIUM SERPL-SCNC: 145 MMOL/L (ref 136–145)
SPECIMEN EXPIRATION DATE: NORMAL
WBC # BLD AUTO: 5.42 THOUSAND/UL (ref 4.31–10.16)
WBC # BLD AUTO: 5.64 THOUSAND/UL (ref 4.31–10.16)

## 2019-03-30 PROCEDURE — 85027 COMPLETE CBC AUTOMATED: CPT | Performed by: PHYSICIAN ASSISTANT

## 2019-03-30 PROCEDURE — 93005 ELECTROCARDIOGRAM TRACING: CPT

## 2019-03-30 PROCEDURE — 86850 RBC ANTIBODY SCREEN: CPT | Performed by: EMERGENCY MEDICINE

## 2019-03-30 PROCEDURE — 86901 BLOOD TYPING SEROLOGIC RH(D): CPT | Performed by: EMERGENCY MEDICINE

## 2019-03-30 PROCEDURE — 94760 N-INVAS EAR/PLS OXIMETRY 1: CPT

## 2019-03-30 PROCEDURE — 85730 THROMBOPLASTIN TIME PARTIAL: CPT | Performed by: PHYSICIAN ASSISTANT

## 2019-03-30 PROCEDURE — 80053 COMPREHEN METABOLIC PANEL: CPT | Performed by: PHYSICIAN ASSISTANT

## 2019-03-30 PROCEDURE — 83605 ASSAY OF LACTIC ACID: CPT | Performed by: PHYSICIAN ASSISTANT

## 2019-03-30 PROCEDURE — 36415 COLL VENOUS BLD VENIPUNCTURE: CPT | Performed by: EMERGENCY MEDICINE

## 2019-03-30 PROCEDURE — 30233N1 TRANSFUSION OF NONAUTOLOGOUS RED BLOOD CELLS INTO PERIPHERAL VEIN, PERCUTANEOUS APPROACH: ICD-10-PCS | Performed by: INTERNAL MEDICINE

## 2019-03-30 PROCEDURE — 86900 BLOOD TYPING SEROLOGIC ABO: CPT | Performed by: EMERGENCY MEDICINE

## 2019-03-30 PROCEDURE — P9016 RBC LEUKOCYTES REDUCED: HCPCS

## 2019-03-30 PROCEDURE — 80048 BASIC METABOLIC PNL TOTAL CA: CPT | Performed by: EMERGENCY MEDICINE

## 2019-03-30 PROCEDURE — 71045 X-RAY EXAM CHEST 1 VIEW: CPT

## 2019-03-30 PROCEDURE — 85025 COMPLETE CBC W/AUTO DIFF WBC: CPT | Performed by: EMERGENCY MEDICINE

## 2019-03-30 PROCEDURE — 86920 COMPATIBILITY TEST SPIN: CPT

## 2019-03-30 PROCEDURE — 85610 PROTHROMBIN TIME: CPT | Performed by: PHYSICIAN ASSISTANT

## 2019-03-30 PROCEDURE — 99220 PR INITIAL OBSERVATION CARE/DAY 70 MINUTES: CPT | Performed by: HOSPITALIST

## 2019-03-30 PROCEDURE — 99285 EMERGENCY DEPT VISIT HI MDM: CPT

## 2019-03-30 PROCEDURE — 85018 HEMOGLOBIN: CPT | Performed by: HOSPITALIST

## 2019-03-30 RX ORDER — ACETAMINOPHEN 325 MG/1
650 TABLET ORAL EVERY 6 HOURS PRN
Status: DISCONTINUED | OUTPATIENT
Start: 2019-03-30 | End: 2019-04-01 | Stop reason: HOSPADM

## 2019-03-30 RX ORDER — PANTOPRAZOLE SODIUM 40 MG/1
40 INJECTION, POWDER, FOR SOLUTION INTRAVENOUS
Status: DISCONTINUED | OUTPATIENT
Start: 2019-03-30 | End: 2019-04-01 | Stop reason: HOSPADM

## 2019-03-30 RX ORDER — CALCIUM CARBONATE 200(500)MG
1000 TABLET,CHEWABLE ORAL 2 TIMES DAILY PRN
Status: DISCONTINUED | OUTPATIENT
Start: 2019-03-30 | End: 2019-04-01 | Stop reason: HOSPADM

## 2019-03-30 RX ORDER — CARVEDILOL 12.5 MG/1
12.5 TABLET ORAL 2 TIMES DAILY WITH MEALS
Status: DISCONTINUED | OUTPATIENT
Start: 2019-03-30 | End: 2019-03-30

## 2019-03-30 RX ORDER — SODIUM CHLORIDE 9 MG/ML
100 INJECTION, SOLUTION INTRAVENOUS CONTINUOUS
Status: DISCONTINUED | OUTPATIENT
Start: 2019-03-30 | End: 2019-03-31

## 2019-03-30 RX ORDER — NYSTATIN 100000 [USP'U]/G
POWDER TOPICAL 3 TIMES DAILY
Status: DISCONTINUED | OUTPATIENT
Start: 2019-03-30 | End: 2019-04-01 | Stop reason: HOSPADM

## 2019-03-30 RX ORDER — FUROSEMIDE 10 MG/ML
20 INJECTION INTRAMUSCULAR; INTRAVENOUS ONCE
Status: COMPLETED | OUTPATIENT
Start: 2019-03-30 | End: 2019-03-31

## 2019-03-30 RX ORDER — ONDANSETRON 2 MG/ML
4 INJECTION INTRAMUSCULAR; INTRAVENOUS EVERY 6 HOURS PRN
Status: DISCONTINUED | OUTPATIENT
Start: 2019-03-30 | End: 2019-04-01 | Stop reason: HOSPADM

## 2019-03-30 RX ORDER — LEVOTHYROXINE SODIUM 112 UG/1
112 TABLET ORAL
Status: DISCONTINUED | OUTPATIENT
Start: 2019-03-31 | End: 2019-04-01 | Stop reason: HOSPADM

## 2019-03-30 RX ADMIN — NYSTATIN 1 APPLICATION: 100000 POWDER TOPICAL at 17:00

## 2019-03-30 RX ADMIN — NYSTATIN 1 APPLICATION: 100000 POWDER TOPICAL at 21:06

## 2019-03-30 RX ADMIN — SODIUM CHLORIDE 100 ML/HR: 0.9 INJECTION, SOLUTION INTRAVENOUS at 15:05

## 2019-03-30 RX ADMIN — CARVEDILOL 12.5 MG: 12.5 TABLET, FILM COATED ORAL at 17:18

## 2019-03-31 ENCOUNTER — ANESTHESIA EVENT (INPATIENT)
Dept: PERIOP | Facility: HOSPITAL | Age: 84
DRG: 393 | End: 2019-03-31
Payer: COMMERCIAL

## 2019-03-31 ENCOUNTER — ANESTHESIA (INPATIENT)
Dept: PERIOP | Facility: HOSPITAL | Age: 84
DRG: 393 | End: 2019-03-31
Payer: COMMERCIAL

## 2019-03-31 PROBLEM — K92.2 LOWER GI BLEED: Status: ACTIVE | Noted: 2019-03-30

## 2019-03-31 LAB
ABO GROUP BLD BPU: NORMAL
ABO GROUP BLD BPU: NORMAL
ALBUMIN SERPL BCP-MCNC: 2.1 G/DL (ref 3.5–5)
ALP SERPL-CCNC: 59 U/L (ref 46–116)
ALT SERPL W P-5'-P-CCNC: 18 U/L (ref 12–78)
ANION GAP SERPL CALCULATED.3IONS-SCNC: 9 MMOL/L (ref 4–13)
ANION GAP SERPL CALCULATED.3IONS-SCNC: 9 MMOL/L (ref 4–13)
AST SERPL W P-5'-P-CCNC: 20 U/L (ref 5–45)
ATRIAL RATE: 59 BPM
BILIRUB SERPL-MCNC: 0.4 MG/DL (ref 0.2–1)
BPU ID: NORMAL
BPU ID: NORMAL
BUN SERPL-MCNC: 26 MG/DL (ref 5–25)
BUN SERPL-MCNC: 29 MG/DL (ref 5–25)
CALCIUM SERPL-MCNC: 8.2 MG/DL (ref 8.3–10.1)
CALCIUM SERPL-MCNC: 8.9 MG/DL (ref 8.3–10.1)
CHLORIDE SERPL-SCNC: 109 MMOL/L (ref 100–108)
CHLORIDE SERPL-SCNC: 113 MMOL/L (ref 100–108)
CO2 SERPL-SCNC: 20 MMOL/L (ref 21–32)
CO2 SERPL-SCNC: 24 MMOL/L (ref 21–32)
CREAT SERPL-MCNC: 0.8 MG/DL (ref 0.6–1.3)
CREAT SERPL-MCNC: 0.82 MG/DL (ref 0.6–1.3)
CROSSMATCH: NORMAL
CROSSMATCH: NORMAL
ERYTHROCYTE [DISTWIDTH] IN BLOOD BY AUTOMATED COUNT: 15.5 % (ref 11.6–15.1)
GFR SERPL CREATININE-BSD FRML MDRD: 61 ML/MIN/1.73SQ M
GFR SERPL CREATININE-BSD FRML MDRD: 62 ML/MIN/1.73SQ M
GLUCOSE SERPL-MCNC: 109 MG/DL (ref 65–140)
GLUCOSE SERPL-MCNC: 128 MG/DL (ref 65–140)
HCT VFR BLD AUTO: 33.4 % (ref 34.8–46.1)
HGB BLD-MCNC: 10.7 G/DL (ref 11.5–15.4)
HGB BLD-MCNC: 10.7 G/DL (ref 11.5–15.4)
HGB BLD-MCNC: 9.9 G/DL (ref 11.5–15.4)
MCH RBC QN AUTO: 29.2 PG (ref 26.8–34.3)
MCHC RBC AUTO-ENTMCNC: 32 G/DL (ref 31.4–37.4)
MCV RBC AUTO: 91 FL (ref 82–98)
PLATELET # BLD AUTO: 151 THOUSANDS/UL (ref 149–390)
PMV BLD AUTO: 9 FL (ref 8.9–12.7)
POTASSIUM SERPL-SCNC: 3.7 MMOL/L (ref 3.5–5.3)
POTASSIUM SERPL-SCNC: 4.4 MMOL/L (ref 3.5–5.3)
PROT SERPL-MCNC: 5 G/DL (ref 6.4–8.2)
QRS AXIS: 13 DEGREES
QRSD INTERVAL: 68 MS
QT INTERVAL: 398 MS
QTC INTERVAL: 394 MS
RBC # BLD AUTO: 3.66 MILLION/UL (ref 3.81–5.12)
SODIUM SERPL-SCNC: 142 MMOL/L (ref 136–145)
SODIUM SERPL-SCNC: 142 MMOL/L (ref 136–145)
T WAVE AXIS: 119 DEGREES
UNIT DISPENSE STATUS: NORMAL
UNIT DISPENSE STATUS: NORMAL
UNIT PRODUCT CODE: NORMAL
UNIT PRODUCT CODE: NORMAL
UNIT RH: NORMAL
UNIT RH: NORMAL
VENTRICULAR RATE: 59 BPM
WBC # BLD AUTO: 7.01 THOUSAND/UL (ref 4.31–10.16)

## 2019-03-31 PROCEDURE — 0W3P8ZZ CONTROL BLEEDING IN GASTROINTESTINAL TRACT, VIA NATURAL OR ARTIFICIAL OPENING ENDOSCOPIC: ICD-10-PCS | Performed by: INTERNAL MEDICINE

## 2019-03-31 PROCEDURE — 30233N1 TRANSFUSION OF NONAUTOLOGOUS RED BLOOD CELLS INTO PERIPHERAL VEIN, PERCUTANEOUS APPROACH: ICD-10-PCS | Performed by: INTERNAL MEDICINE

## 2019-03-31 PROCEDURE — P9016 RBC LEUKOCYTES REDUCED: HCPCS

## 2019-03-31 PROCEDURE — 45385 COLONOSCOPY W/LESION REMOVAL: CPT | Performed by: INTERNAL MEDICINE

## 2019-03-31 PROCEDURE — 93010 ELECTROCARDIOGRAM REPORT: CPT | Performed by: INTERNAL MEDICINE

## 2019-03-31 PROCEDURE — 99232 SBSQ HOSP IP/OBS MODERATE 35: CPT | Performed by: INTERNAL MEDICINE

## 2019-03-31 PROCEDURE — C9113 INJ PANTOPRAZOLE SODIUM, VIA: HCPCS | Performed by: PHYSICIAN ASSISTANT

## 2019-03-31 PROCEDURE — 99254 IP/OBS CNSLTJ NEW/EST MOD 60: CPT | Performed by: INTERNAL MEDICINE

## 2019-03-31 PROCEDURE — 0DJ08ZZ INSPECTION OF UPPER INTESTINAL TRACT, VIA NATURAL OR ARTIFICIAL OPENING ENDOSCOPIC: ICD-10-PCS | Performed by: INTERNAL MEDICINE

## 2019-03-31 PROCEDURE — 85018 HEMOGLOBIN: CPT | Performed by: HOSPITALIST

## 2019-03-31 PROCEDURE — 80048 BASIC METABOLIC PNL TOTAL CA: CPT | Performed by: HOSPITALIST

## 2019-03-31 PROCEDURE — 88305 TISSUE EXAM BY PATHOLOGIST: CPT | Performed by: PATHOLOGY

## 2019-03-31 PROCEDURE — 85018 HEMOGLOBIN: CPT | Performed by: INTERNAL MEDICINE

## 2019-03-31 PROCEDURE — 85027 COMPLETE CBC AUTOMATED: CPT | Performed by: PHYSICIAN ASSISTANT

## 2019-03-31 PROCEDURE — 43235 EGD DIAGNOSTIC BRUSH WASH: CPT | Performed by: INTERNAL MEDICINE

## 2019-03-31 PROCEDURE — 0DBN8ZX EXCISION OF SIGMOID COLON, VIA NATURAL OR ARTIFICIAL OPENING ENDOSCOPIC, DIAGNOSTIC: ICD-10-PCS | Performed by: INTERNAL MEDICINE

## 2019-03-31 RX ORDER — SODIUM CHLORIDE 9 MG/ML
INJECTION, SOLUTION INTRAVENOUS CONTINUOUS PRN
Status: DISCONTINUED | OUTPATIENT
Start: 2019-03-31 | End: 2019-03-31 | Stop reason: SURG

## 2019-03-31 RX ORDER — HYDROMORPHONE HCL/PF 1 MG/ML
0.2 SYRINGE (ML) INJECTION
Status: DISCONTINUED | OUTPATIENT
Start: 2019-03-31 | End: 2019-03-31 | Stop reason: HOSPADM

## 2019-03-31 RX ORDER — PROPOFOL 10 MG/ML
INJECTION, EMULSION INTRAVENOUS CONTINUOUS PRN
Status: DISCONTINUED | OUTPATIENT
Start: 2019-03-31 | End: 2019-03-31 | Stop reason: SURG

## 2019-03-31 RX ORDER — SODIUM CHLORIDE, SODIUM LACTATE, POTASSIUM CHLORIDE, CALCIUM CHLORIDE 600; 310; 30; 20 MG/100ML; MG/100ML; MG/100ML; MG/100ML
20 INJECTION, SOLUTION INTRAVENOUS CONTINUOUS
Status: DISCONTINUED | OUTPATIENT
Start: 2019-03-31 | End: 2019-04-01

## 2019-03-31 RX ORDER — PROPOFOL 10 MG/ML
INJECTION, EMULSION INTRAVENOUS AS NEEDED
Status: DISCONTINUED | OUTPATIENT
Start: 2019-03-31 | End: 2019-03-31 | Stop reason: SURG

## 2019-03-31 RX ORDER — ONDANSETRON 2 MG/ML
4 INJECTION INTRAMUSCULAR; INTRAVENOUS ONCE AS NEEDED
Status: DISCONTINUED | OUTPATIENT
Start: 2019-03-31 | End: 2019-03-31 | Stop reason: HOSPADM

## 2019-03-31 RX ORDER — FUROSEMIDE 10 MG/ML
20 INJECTION INTRAMUSCULAR; INTRAVENOUS ONCE
Status: COMPLETED | OUTPATIENT
Start: 2019-03-31 | End: 2019-03-31

## 2019-03-31 RX ORDER — FENTANYL CITRATE/PF 50 MCG/ML
25 SYRINGE (ML) INJECTION
Status: DISCONTINUED | OUTPATIENT
Start: 2019-03-31 | End: 2019-03-31 | Stop reason: HOSPADM

## 2019-03-31 RX ADMIN — PANTOPRAZOLE SODIUM 40 MG: 40 INJECTION, POWDER, FOR SOLUTION INTRAVENOUS at 08:29

## 2019-03-31 RX ADMIN — NYSTATIN 1 APPLICATION: 100000 POWDER TOPICAL at 21:41

## 2019-03-31 RX ADMIN — NYSTATIN: 100000 POWDER TOPICAL at 08:29

## 2019-03-31 RX ADMIN — PANTOPRAZOLE SODIUM 40 MG: 40 INJECTION, POWDER, FOR SOLUTION INTRAVENOUS at 00:58

## 2019-03-31 RX ADMIN — POLYETHYLENE GLYCOL 3350, SODIUM SULFATE ANHYDROUS, SODIUM BICARBONATE, SODIUM CHLORIDE, POTASSIUM CHLORIDE 2000 ML: 236; 22.74; 6.74; 5.86; 2.97 POWDER, FOR SOLUTION ORAL at 00:58

## 2019-03-31 RX ADMIN — FUROSEMIDE 20 MG: 10 INJECTION, SOLUTION INTRAMUSCULAR; INTRAVENOUS at 06:10

## 2019-03-31 RX ADMIN — FUROSEMIDE 20 MG: 10 INJECTION, SOLUTION INTRAMUSCULAR; INTRAVENOUS at 03:14

## 2019-03-31 RX ADMIN — SODIUM CHLORIDE: 0.9 INJECTION, SOLUTION INTRAVENOUS at 15:20

## 2019-03-31 RX ADMIN — PROPOFOL 50 MG: 10 INJECTION, EMULSION INTRAVENOUS at 15:36

## 2019-03-31 RX ADMIN — PROPOFOL 50 MCG/KG/MIN: 10 INJECTION, EMULSION INTRAVENOUS at 15:36

## 2019-03-31 NOTE — ANESTHESIA PREPROCEDURE EVALUATION
Review of Systems/Medical History  Patient summary reviewed  Chart reviewed  No history of anesthetic complications     Cardiovascular  No pacemaker/AICD, Hyperlipidemia, Hypertension , No CAD , No CHF ,    Pulmonary       GI/Hepatic            Endo/Other     GYN       Hematology   Musculoskeletal       Neurology    No TIA, No CVA ,    Psychology         Lab Results   Component Value Date    WBC 7 01 2019    HGB 10 7 (L) 2019     2019     Lab Results   Component Value Date    K 4 4 2019    BUN 26 (H) 2019    CREATININE 0 80 2019     Lab Results   Component Value Date    PTT 27 2019      Lab Results   Component Value Date    INR 1 27 (H) 2019       Blood type A    No results found for: HGBA1C    Physical Exam    Airway    Mallampati score: II  TM Distance: >3 FB       Dental       Cardiovascular      Pulmonary      Other Findings    TTE 2015: Nic [address] [place] [address]   Phone: [phone]   TRANSTHORACIC ECHOCARDIOGRAM   2D, M-MODE, DOPPLER, AND COLOR DOPPLER   Study date:  26-Mar-2015   Patient: Luiz Gordon CASE   MR number: [id number]   Account number: [de-identified]   : 13-May-1922   Age: 80 years   Gender: Female   Status: Outpatient   Location: [facility]   Height: 61 in   Weight: 173 lb   BP: 158/ 102 mmHg   Indications: Abnormal EKG  Diagnoses: 794 31 - ABNORM ELECTROCARDIOGRAM   Sonographer:  АЛЕКСАНДР Nelson   Primary Physician:  Christiano Calvo MD   Referring Physician:  Madelyn Ayoub MD   Group:  [id number]   Interpreting Physician:  Madelyn Ayoub MD   SUMMARY   LEFT VENTRICLE:   Systolic function was normal  Ejection fraction was estimated to be 65 %  There were no regional wall motion abnormalities  There was mild concentric hypertrophy     Doppler parameters were consistent with abnormal left ventricular relaxation   (grade 1 diastolic dysfunction)  Doppler parameters were consistent with high   ventricular filling pressure, with an E/E' of 25  MITRAL VALVE:   There was mild regurgitation  AORTIC VALVE:   There was mild regurgitation  Anesthesia Plan  ASA Score- 3     Anesthesia Type- general with ASA Monitors  Additional Monitors:   Airway Plan:     Comment:  LOPEZ Castellanos , have personally seen and evaluated the patient prior to anesthetic care  I have reviewed the pre-anesthetic record, and other medical records if appropriate to the anesthetic care  If a CRNA is involved in the case, I have reviewed the CRNA assessment, if present, and agree  Risks/benefits and alternatives discussed with patient including possible PONV, sore throat, and possibility of rare anesthetic and surgical emergencies  NO NAUSEA/VOMITING; NO HEMATEMESIS    Plan Factors- Patient instructed to abstain from smoking on day of procedure  Patient did not smoke on day of surgery  Induction- intravenous  Postoperative Plan- Plan for postoperative opioid use  Planned trial extubation    Informed Consent- Anesthetic plan and risks discussed with patient  I personally reviewed this patient with the CRNA  Discussed and agreed on the Anesthesia Plan with the CRNA  Arleth Hightower

## 2019-03-31 NOTE — ANESTHESIA POSTPROCEDURE EVALUATION
Post-Op Assessment Note    CV Status:  Stable  Pain Score: 0    Pain management: adequate     Mental Status:  Alert and awake   Hydration Status:  Euvolemic   PONV Controlled:  Controlled   Airway Patency:  Patent and adequate   Post Op Vitals Reviewed: Yes      Staff: CRNA           BP   126/58   Temp      Pulse  64   Resp   15   SpO2   99%

## 2019-04-01 ENCOUNTER — TRANSITIONAL CARE MANAGEMENT (OUTPATIENT)
Dept: INTERNAL MEDICINE CLINIC | Facility: CLINIC | Age: 84
End: 2019-04-01

## 2019-04-01 VITALS
OXYGEN SATURATION: 95 % | DIASTOLIC BLOOD PRESSURE: 61 MMHG | RESPIRATION RATE: 18 BRPM | TEMPERATURE: 97.8 F | HEART RATE: 70 BPM | SYSTOLIC BLOOD PRESSURE: 135 MMHG | BODY MASS INDEX: 31.08 KG/M2 | WEIGHT: 164.46 LBS

## 2019-04-01 PROBLEM — K92.2 GI BLEED: Status: RESOLVED | Noted: 2019-03-30 | Resolved: 2019-04-01

## 2019-04-01 PROBLEM — K92.2 LOWER GI BLEED: Status: RESOLVED | Noted: 2019-03-30 | Resolved: 2019-04-01

## 2019-04-01 LAB
ANION GAP SERPL CALCULATED.3IONS-SCNC: 9 MMOL/L (ref 4–13)
BUN SERPL-MCNC: 23 MG/DL (ref 5–25)
CALCIUM SERPL-MCNC: 8.7 MG/DL (ref 8.3–10.1)
CHLORIDE SERPL-SCNC: 109 MMOL/L (ref 100–108)
CO2 SERPL-SCNC: 24 MMOL/L (ref 21–32)
CREAT SERPL-MCNC: 0.86 MG/DL (ref 0.6–1.3)
ERYTHROCYTE [DISTWIDTH] IN BLOOD BY AUTOMATED COUNT: 16.4 % (ref 11.6–15.1)
GFR SERPL CREATININE-BSD FRML MDRD: 57 ML/MIN/1.73SQ M
GLUCOSE SERPL-MCNC: 103 MG/DL (ref 65–140)
HCT VFR BLD AUTO: 30.5 % (ref 34.8–46.1)
HGB BLD-MCNC: 9.7 G/DL (ref 11.5–15.4)
MCH RBC QN AUTO: 28.7 PG (ref 26.8–34.3)
MCHC RBC AUTO-ENTMCNC: 31.8 G/DL (ref 31.4–37.4)
MCV RBC AUTO: 90 FL (ref 82–98)
PLATELET # BLD AUTO: 158 THOUSANDS/UL (ref 149–390)
PMV BLD AUTO: 8.7 FL (ref 8.9–12.7)
POTASSIUM SERPL-SCNC: 3.7 MMOL/L (ref 3.5–5.3)
RBC # BLD AUTO: 3.38 MILLION/UL (ref 3.81–5.12)
SODIUM SERPL-SCNC: 142 MMOL/L (ref 136–145)
WBC # BLD AUTO: 7.87 THOUSAND/UL (ref 4.31–10.16)

## 2019-04-01 PROCEDURE — C9113 INJ PANTOPRAZOLE SODIUM, VIA: HCPCS | Performed by: INTERNAL MEDICINE

## 2019-04-01 PROCEDURE — 99232 SBSQ HOSP IP/OBS MODERATE 35: CPT | Performed by: INTERNAL MEDICINE

## 2019-04-01 PROCEDURE — 99239 HOSP IP/OBS DSCHRG MGMT >30: CPT | Performed by: INTERNAL MEDICINE

## 2019-04-01 PROCEDURE — 80048 BASIC METABOLIC PNL TOTAL CA: CPT | Performed by: PHYSICIAN ASSISTANT

## 2019-04-01 PROCEDURE — 85027 COMPLETE CBC AUTOMATED: CPT | Performed by: PHYSICIAN ASSISTANT

## 2019-04-01 RX ADMIN — NYSTATIN: 100000 POWDER TOPICAL at 09:42

## 2019-04-01 RX ADMIN — LEVOTHYROXINE SODIUM 112 MCG: 112 TABLET ORAL at 05:42

## 2019-04-01 RX ADMIN — PANTOPRAZOLE SODIUM 40 MG: 40 INJECTION, POWDER, FOR SOLUTION INTRAVENOUS at 09:42

## 2019-04-05 ENCOUNTER — OFFICE VISIT (OUTPATIENT)
Dept: INTERNAL MEDICINE CLINIC | Facility: CLINIC | Age: 84
End: 2019-04-05
Payer: COMMERCIAL

## 2019-04-05 VITALS
BODY MASS INDEX: 31.51 KG/M2 | OXYGEN SATURATION: 94 % | HEIGHT: 61 IN | DIASTOLIC BLOOD PRESSURE: 72 MMHG | RESPIRATION RATE: 16 BRPM | SYSTOLIC BLOOD PRESSURE: 136 MMHG | WEIGHT: 166.9 LBS | HEART RATE: 85 BPM | TEMPERATURE: 97.6 F

## 2019-04-05 DIAGNOSIS — K92.2 LOWER GI BLEED: Primary | ICD-10-CM

## 2019-04-05 DIAGNOSIS — E03.9 ACQUIRED HYPOTHYROIDISM: ICD-10-CM

## 2019-04-05 DIAGNOSIS — I10 BENIGN ESSENTIAL HYPERTENSION: ICD-10-CM

## 2019-04-05 DIAGNOSIS — R44.1 HALLUCINATIONS, VISUAL: ICD-10-CM

## 2019-04-05 PROCEDURE — 99495 TRANSJ CARE MGMT MOD F2F 14D: CPT | Performed by: INTERNAL MEDICINE

## 2019-04-05 PROCEDURE — 1160F RVW MEDS BY RX/DR IN RCRD: CPT | Performed by: INTERNAL MEDICINE

## 2019-04-08 ENCOUNTER — TELEPHONE (OUTPATIENT)
Dept: GASTROENTEROLOGY | Facility: CLINIC | Age: 84
End: 2019-04-08

## 2019-04-15 ENCOUNTER — APPOINTMENT (OUTPATIENT)
Dept: LAB | Facility: CLINIC | Age: 84
End: 2019-04-15
Payer: COMMERCIAL

## 2019-04-15 DIAGNOSIS — K92.2 LOWER GI BLEED: ICD-10-CM

## 2019-04-15 LAB
BASOPHILS # BLD AUTO: 0.02 THOUSANDS/ΜL (ref 0–0.1)
BASOPHILS NFR BLD AUTO: 0 % (ref 0–1)
EOSINOPHIL # BLD AUTO: 0.24 THOUSAND/ΜL (ref 0–0.61)
EOSINOPHIL NFR BLD AUTO: 5 % (ref 0–6)
ERYTHROCYTE [DISTWIDTH] IN BLOOD BY AUTOMATED COUNT: 14.7 % (ref 11.6–15.1)
HCT VFR BLD AUTO: 30.7 % (ref 34.8–46.1)
HGB BLD-MCNC: 9.3 G/DL (ref 11.5–15.4)
IMM GRANULOCYTES # BLD AUTO: 0.01 THOUSAND/UL (ref 0–0.2)
IMM GRANULOCYTES NFR BLD AUTO: 0 % (ref 0–2)
LYMPHOCYTES # BLD AUTO: 1.55 THOUSANDS/ΜL (ref 0.6–4.47)
LYMPHOCYTES NFR BLD AUTO: 30 % (ref 14–44)
MCH RBC QN AUTO: 28.9 PG (ref 26.8–34.3)
MCHC RBC AUTO-ENTMCNC: 30.3 G/DL (ref 31.4–37.4)
MCV RBC AUTO: 95 FL (ref 82–98)
MONOCYTES # BLD AUTO: 0.42 THOUSAND/ΜL (ref 0.17–1.22)
MONOCYTES NFR BLD AUTO: 8 % (ref 4–12)
NEUTROPHILS # BLD AUTO: 2.96 THOUSANDS/ΜL (ref 1.85–7.62)
NEUTS SEG NFR BLD AUTO: 57 % (ref 43–75)
NRBC BLD AUTO-RTO: 0 /100 WBCS
PLATELET # BLD AUTO: 212 THOUSANDS/UL (ref 149–390)
PMV BLD AUTO: 9.1 FL (ref 8.9–12.7)
RBC # BLD AUTO: 3.22 MILLION/UL (ref 3.81–5.12)
WBC # BLD AUTO: 5.2 THOUSAND/UL (ref 4.31–10.16)

## 2019-04-15 PROCEDURE — 85025 COMPLETE CBC W/AUTO DIFF WBC: CPT

## 2019-04-15 PROCEDURE — 36415 COLL VENOUS BLD VENIPUNCTURE: CPT

## 2019-04-16 DIAGNOSIS — K92.2 LOWER GI BLEED: Primary | ICD-10-CM

## 2019-05-11 ENCOUNTER — OFFICE VISIT (OUTPATIENT)
Dept: URGENT CARE | Facility: CLINIC | Age: 84
End: 2019-05-11
Payer: COMMERCIAL

## 2019-05-11 VITALS
BODY MASS INDEX: 31.41 KG/M2 | RESPIRATION RATE: 24 BRPM | TEMPERATURE: 97.6 F | DIASTOLIC BLOOD PRESSURE: 56 MMHG | OXYGEN SATURATION: 94 % | SYSTOLIC BLOOD PRESSURE: 114 MMHG | WEIGHT: 160 LBS | HEIGHT: 60 IN | HEART RATE: 71 BPM

## 2019-05-11 DIAGNOSIS — J06.9 UPPER RESPIRATORY INFECTION, VIRAL: ICD-10-CM

## 2019-05-11 DIAGNOSIS — R05.9 COUGH: Primary | ICD-10-CM

## 2019-05-11 PROCEDURE — 99213 OFFICE O/P EST LOW 20 MIN: CPT | Performed by: PHYSICIAN ASSISTANT

## 2019-05-11 RX ORDER — FLUTICASONE PROPIONATE 50 MCG
1 SPRAY, SUSPENSION (ML) NASAL DAILY
Qty: 16 G | Refills: 0 | Status: SHIPPED | OUTPATIENT
Start: 2019-05-11

## 2019-05-11 RX ORDER — BROMPHENIRAMINE MALEATE, PSEUDOEPHEDRINE HYDROCHLORIDE, AND DEXTROMETHORPHAN HYDROBROMIDE 2; 30; 10 MG/5ML; MG/5ML; MG/5ML
5 SYRUP ORAL 4 TIMES DAILY PRN
Qty: 118 ML | Refills: 0 | Status: SHIPPED | OUTPATIENT
Start: 2019-05-11 | End: 2019-11-25 | Stop reason: ALTCHOICE

## 2019-05-13 ENCOUNTER — TELEPHONE (OUTPATIENT)
Dept: INTERNAL MEDICINE CLINIC | Facility: CLINIC | Age: 84
End: 2019-05-13

## 2019-05-13 DIAGNOSIS — R05.9 COUGH: Primary | ICD-10-CM

## 2019-05-13 RX ORDER — BENZONATATE 100 MG/1
100 CAPSULE ORAL 3 TIMES DAILY PRN
Qty: 60 CAPSULE | Refills: 1 | Status: SHIPPED | OUTPATIENT
Start: 2019-05-13 | End: 2019-06-07 | Stop reason: ALTCHOICE

## 2019-05-20 ENCOUNTER — OFFICE VISIT (OUTPATIENT)
Dept: INTERNAL MEDICINE CLINIC | Facility: CLINIC | Age: 84
End: 2019-05-20
Payer: COMMERCIAL

## 2019-05-20 ENCOUNTER — HOSPITAL ENCOUNTER (OUTPATIENT)
Dept: RADIOLOGY | Facility: HOSPITAL | Age: 84
Discharge: HOME | End: 2019-05-20
Payer: COMMERCIAL

## 2019-05-20 VITALS
TEMPERATURE: 97.4 F | BODY MASS INDEX: 30.51 KG/M2 | HEIGHT: 60 IN | HEART RATE: 56 BPM | WEIGHT: 155.4 LBS | SYSTOLIC BLOOD PRESSURE: 125 MMHG | DIASTOLIC BLOOD PRESSURE: 68 MMHG | OXYGEN SATURATION: 96 %

## 2019-05-20 DIAGNOSIS — K92.2 LOWER GI BLEED: Primary | ICD-10-CM

## 2019-05-20 DIAGNOSIS — I10 BENIGN ESSENTIAL HYPERTENSION: ICD-10-CM

## 2019-05-20 DIAGNOSIS — J40 BRONCHITIS: ICD-10-CM

## 2019-05-20 PROBLEM — R05.9 COUGH: Status: ACTIVE | Noted: 2019-05-20

## 2019-05-20 PROCEDURE — 99214 OFFICE O/P EST MOD 30 MIN: CPT | Performed by: INTERNAL MEDICINE

## 2019-05-20 PROCEDURE — 71046 X-RAY EXAM CHEST 2 VIEWS: CPT

## 2019-05-21 ENCOUNTER — TELEPHONE (OUTPATIENT)
Dept: INTERNAL MEDICINE CLINIC | Facility: CLINIC | Age: 84
End: 2019-05-21

## 2019-05-21 DIAGNOSIS — J40 BRONCHITIS: Primary | ICD-10-CM

## 2019-05-21 RX ORDER — AZITHROMYCIN 250 MG/1
250 TABLET, FILM COATED ORAL EVERY 24 HOURS
Qty: 6 TABLET | Refills: 0 | Status: SHIPPED | OUTPATIENT
Start: 2019-05-21 | End: 2019-05-26

## 2019-06-07 ENCOUNTER — OFFICE VISIT (OUTPATIENT)
Dept: INTERNAL MEDICINE CLINIC | Facility: CLINIC | Age: 84
End: 2019-06-07
Payer: COMMERCIAL

## 2019-06-07 VITALS
WEIGHT: 155.2 LBS | TEMPERATURE: 98.8 F | BODY MASS INDEX: 30.47 KG/M2 | HEIGHT: 60 IN | DIASTOLIC BLOOD PRESSURE: 82 MMHG | RESPIRATION RATE: 18 BRPM | HEART RATE: 74 BPM | OXYGEN SATURATION: 93 % | SYSTOLIC BLOOD PRESSURE: 126 MMHG

## 2019-06-07 DIAGNOSIS — J20.9 ACUTE BRONCHITIS, UNSPECIFIED ORGANISM: Primary | ICD-10-CM

## 2019-06-07 PROCEDURE — 99213 OFFICE O/P EST LOW 20 MIN: CPT | Performed by: NURSE PRACTITIONER

## 2019-06-07 PROCEDURE — 1160F RVW MEDS BY RX/DR IN RCRD: CPT | Performed by: NURSE PRACTITIONER

## 2019-06-07 RX ORDER — METHYLPREDNISOLONE 4 MG/1
TABLET ORAL
Qty: 21 EACH | Refills: 0 | Status: SHIPPED | OUTPATIENT
Start: 2019-06-07 | End: 2019-11-25 | Stop reason: ALTCHOICE

## 2019-06-07 RX ORDER — DOXYCYCLINE 100 MG/1
100 CAPSULE ORAL 2 TIMES DAILY
Qty: 14 CAPSULE | Refills: 0 | Status: SHIPPED | OUTPATIENT
Start: 2019-06-07 | End: 2019-06-14

## 2019-06-07 RX ORDER — DEXTROMETHORPHAN HYDROBROMIDE AND PROMETHAZINE HYDROCHLORIDE 15; 6.25 MG/5ML; MG/5ML
5 SOLUTION ORAL 4 TIMES DAILY PRN
Qty: 240 ML | Refills: 0 | Status: SHIPPED | OUTPATIENT
Start: 2019-06-07 | End: 2019-11-25 | Stop reason: ALTCHOICE

## 2019-06-24 ENCOUNTER — OFFICE VISIT (OUTPATIENT)
Dept: INTERNAL MEDICINE CLINIC | Facility: CLINIC | Age: 84
End: 2019-06-24
Payer: COMMERCIAL

## 2019-06-24 VITALS
TEMPERATURE: 97.5 F | DIASTOLIC BLOOD PRESSURE: 98 MMHG | HEART RATE: 81 BPM | BODY MASS INDEX: 30.63 KG/M2 | HEIGHT: 60 IN | WEIGHT: 156 LBS | SYSTOLIC BLOOD PRESSURE: 133 MMHG | OXYGEN SATURATION: 93 %

## 2019-06-24 DIAGNOSIS — E78.2 MIXED HYPERLIPIDEMIA: ICD-10-CM

## 2019-06-24 DIAGNOSIS — R00.2 PALPITATIONS: Primary | ICD-10-CM

## 2019-06-24 DIAGNOSIS — K92.2 LOWER GI BLEED: ICD-10-CM

## 2019-06-24 DIAGNOSIS — E03.9 ACQUIRED HYPOTHYROIDISM: ICD-10-CM

## 2019-06-24 DIAGNOSIS — I10 BENIGN ESSENTIAL HYPERTENSION: ICD-10-CM

## 2019-06-24 PROBLEM — J20.9 ACUTE BRONCHITIS: Status: RESOLVED | Noted: 2019-05-20 | Resolved: 2019-06-24

## 2019-06-24 PROCEDURE — 99214 OFFICE O/P EST MOD 30 MIN: CPT | Performed by: INTERNAL MEDICINE

## 2019-06-24 PROCEDURE — 1036F TOBACCO NON-USER: CPT | Performed by: INTERNAL MEDICINE

## 2019-06-25 DIAGNOSIS — I10 BENIGN ESSENTIAL HYPERTENSION: ICD-10-CM

## 2019-06-25 RX ORDER — LOSARTAN POTASSIUM 50 MG/1
50 TABLET ORAL DAILY
Qty: 90 TABLET | Refills: 3 | Status: SHIPPED | OUTPATIENT
Start: 2019-06-25 | End: 2020-06-23

## 2019-08-21 ENCOUNTER — APPOINTMENT (OUTPATIENT)
Dept: LAB | Facility: CLINIC | Age: 84
End: 2019-08-21
Payer: COMMERCIAL

## 2019-08-21 DIAGNOSIS — E55.9 VITAMIN D DEFICIENCY: ICD-10-CM

## 2019-08-21 DIAGNOSIS — E03.9 ACQUIRED HYPOTHYROIDISM: ICD-10-CM

## 2019-08-21 DIAGNOSIS — E78.2 MIXED HYPERLIPIDEMIA: ICD-10-CM

## 2019-08-21 LAB
25(OH)D3 SERPL-MCNC: 29.1 NG/ML (ref 30–100)
ALBUMIN SERPL BCP-MCNC: 3.5 G/DL (ref 3.5–5)
ALP SERPL-CCNC: 81 U/L (ref 46–116)
ALT SERPL W P-5'-P-CCNC: 19 U/L (ref 12–78)
ANION GAP SERPL CALCULATED.3IONS-SCNC: 7 MMOL/L (ref 4–13)
AST SERPL W P-5'-P-CCNC: 25 U/L (ref 5–45)
BASOPHILS # BLD AUTO: 0.05 THOUSANDS/ΜL (ref 0–0.1)
BASOPHILS NFR BLD AUTO: 1 % (ref 0–1)
BILIRUB SERPL-MCNC: 1.02 MG/DL (ref 0.2–1)
BUN SERPL-MCNC: 30 MG/DL (ref 5–25)
CALCIUM SERPL-MCNC: 9.3 MG/DL (ref 8.3–10.1)
CHLORIDE SERPL-SCNC: 107 MMOL/L (ref 100–108)
CHOLEST SERPL-MCNC: 200 MG/DL (ref 50–200)
CO2 SERPL-SCNC: 27 MMOL/L (ref 21–32)
CREAT SERPL-MCNC: 0.76 MG/DL (ref 0.6–1.3)
EOSINOPHIL # BLD AUTO: 0.18 THOUSAND/ΜL (ref 0–0.61)
EOSINOPHIL NFR BLD AUTO: 3 % (ref 0–6)
ERYTHROCYTE [DISTWIDTH] IN BLOOD BY AUTOMATED COUNT: 16.7 % (ref 11.6–15.1)
GFR SERPL CREATININE-BSD FRML MDRD: 66 ML/MIN/1.73SQ M
GLUCOSE P FAST SERPL-MCNC: 93 MG/DL (ref 65–99)
HCT VFR BLD AUTO: 34.6 % (ref 34.8–46.1)
HDLC SERPL-MCNC: 48 MG/DL (ref 40–60)
HGB BLD-MCNC: 10.2 G/DL (ref 11.5–15.4)
IMM GRANULOCYTES # BLD AUTO: 0.01 THOUSAND/UL (ref 0–0.2)
IMM GRANULOCYTES NFR BLD AUTO: 0 % (ref 0–2)
LDLC SERPL CALC-MCNC: 128 MG/DL (ref 0–100)
LYMPHOCYTES # BLD AUTO: 1.91 THOUSANDS/ΜL (ref 0.6–4.47)
LYMPHOCYTES NFR BLD AUTO: 34 % (ref 14–44)
MCH RBC QN AUTO: 26.1 PG (ref 26.8–34.3)
MCHC RBC AUTO-ENTMCNC: 29.5 G/DL (ref 31.4–37.4)
MCV RBC AUTO: 89 FL (ref 82–98)
MONOCYTES # BLD AUTO: 0.49 THOUSAND/ΜL (ref 0.17–1.22)
MONOCYTES NFR BLD AUTO: 9 % (ref 4–12)
NEUTROPHILS # BLD AUTO: 3.01 THOUSANDS/ΜL (ref 1.85–7.62)
NEUTS SEG NFR BLD AUTO: 53 % (ref 43–75)
NRBC BLD AUTO-RTO: 0 /100 WBCS
PLATELET # BLD AUTO: 195 THOUSANDS/UL (ref 149–390)
PMV BLD AUTO: 9.8 FL (ref 8.9–12.7)
POTASSIUM SERPL-SCNC: 4.2 MMOL/L (ref 3.5–5.3)
PROT SERPL-MCNC: 7.4 G/DL (ref 6.4–8.2)
RBC # BLD AUTO: 3.91 MILLION/UL (ref 3.81–5.12)
SODIUM SERPL-SCNC: 141 MMOL/L (ref 136–145)
TRIGL SERPL-MCNC: 119 MG/DL
TSH SERPL DL<=0.05 MIU/L-ACNC: 1.14 UIU/ML (ref 0.36–3.74)
WBC # BLD AUTO: 5.65 THOUSAND/UL (ref 4.31–10.16)

## 2019-08-21 PROCEDURE — 82306 VITAMIN D 25 HYDROXY: CPT

## 2019-08-21 PROCEDURE — 80061 LIPID PANEL: CPT

## 2019-08-21 PROCEDURE — 84443 ASSAY THYROID STIM HORMONE: CPT

## 2019-08-21 PROCEDURE — 80053 COMPREHEN METABOLIC PANEL: CPT

## 2019-08-21 PROCEDURE — 36415 COLL VENOUS BLD VENIPUNCTURE: CPT

## 2019-08-21 PROCEDURE — 85025 COMPLETE CBC W/AUTO DIFF WBC: CPT

## 2019-10-28 ENCOUNTER — OFFICE VISIT (OUTPATIENT)
Dept: INTERNAL MEDICINE CLINIC | Facility: CLINIC | Age: 84
End: 2019-10-28
Payer: COMMERCIAL

## 2019-10-28 VITALS
HEIGHT: 60 IN | WEIGHT: 163.4 LBS | SYSTOLIC BLOOD PRESSURE: 152 MMHG | DIASTOLIC BLOOD PRESSURE: 90 MMHG | BODY MASS INDEX: 32.08 KG/M2 | TEMPERATURE: 97.7 F | OXYGEN SATURATION: 98 % | HEART RATE: 71 BPM

## 2019-10-28 DIAGNOSIS — E78.2 MIXED HYPERLIPIDEMIA: ICD-10-CM

## 2019-10-28 DIAGNOSIS — I10 BENIGN ESSENTIAL HYPERTENSION: Primary | ICD-10-CM

## 2019-10-28 DIAGNOSIS — Z23 NEEDS FLU SHOT: ICD-10-CM

## 2019-10-28 DIAGNOSIS — E55.9 VITAMIN D DEFICIENCY: ICD-10-CM

## 2019-10-28 DIAGNOSIS — E03.9 ACQUIRED HYPOTHYROIDISM: ICD-10-CM

## 2019-10-28 DIAGNOSIS — R00.2 PALPITATIONS: ICD-10-CM

## 2019-10-28 DIAGNOSIS — Z00.00 ENCOUNTER FOR MEDICARE ANNUAL WELLNESS EXAM: ICD-10-CM

## 2019-10-28 PROCEDURE — 1170F FXNL STATUS ASSESSED: CPT

## 2019-10-28 PROCEDURE — G0008 ADMIN INFLUENZA VIRUS VAC: HCPCS

## 2019-10-28 PROCEDURE — 99214 OFFICE O/P EST MOD 30 MIN: CPT | Performed by: INTERNAL MEDICINE

## 2019-10-28 PROCEDURE — 1125F AMNT PAIN NOTED PAIN PRSNT: CPT

## 2019-10-28 PROCEDURE — G0439 PPPS, SUBSEQ VISIT: HCPCS | Performed by: INTERNAL MEDICINE

## 2019-10-28 PROCEDURE — 90662 IIV NO PRSV INCREASED AG IM: CPT

## 2019-10-28 NOTE — ASSESSMENT & PLAN NOTE
Discussed preventative health, cancer screening, immunizations, and safety issues  I recommend flu shot today  I recommend Adacel vaccination at the pharmacy  I recommend getting the Shingrix shot to help prevent Shingles  You can get it a pharmacy, and they can administer it there  It is a two shot series with the second shot needed between 2-6 months after the first shot  I would not recommend getting the shot before an important or fun event in case you were to have a reaction to the shot like a sore arm or flu-like symptoms  I make the same recommendation about any shot, as people can have a reaction to any shot

## 2019-10-28 NOTE — PATIENT INSTRUCTIONS
Problem List Items Addressed This Visit        Endocrine    Acquired hypothyroidism     Continue levothyroxine at 112 mcg daily, thyroid function testing in August was normal            Cardiovascular and Mediastinum    Benign essential hypertension - Primary     Slightly elevated, but since patient has some intermittent dizziness, will keep blood pressure meds where they are for now            Other    Mixed hyperlipidemia     Continue with healthy diet, and stay active         Palpitations     Present on exam today, and last EKG shows some PACs         Vitamin D deficiency     Last vitamin-D was low, but better than previous, continue vitamin-D supplementation         Encounter for Medicare annual wellness exam     Discussed preventative health, cancer screening, immunizations, and safety issues  I recommend flu shot today  I recommend Adacel vaccination at the pharmacy  I recommend getting the Shingrix shot to help prevent Shingles  You can get it a pharmacy, and they can administer it there  It is a two shot series with the second shot needed between 2-6 months after the first shot  I would not recommend getting the shot before an important or fun event in case you were to have a reaction to the shot like a sore arm or flu-like symptoms  I make the same recommendation about any shot, as people can have a reaction to any shot             Other Visit Diagnoses     Needs flu shot        Relevant Orders    influenza vaccine, 1768-3805, high-dose, PF 0 5 mL (FLUZONE HIGH-DOSE)

## 2019-10-28 NOTE — ASSESSMENT & PLAN NOTE
Slightly elevated, but since patient has some intermittent dizziness, will keep blood pressure meds where they are for now

## 2019-10-28 NOTE — PROGRESS NOTES
Assessment/Plan:    Encounter for Medicare annual wellness exam  Discussed preventative health, cancer screening, immunizations, and safety issues  I recommend flu shot today  I recommend Adacel vaccination at the pharmacy  I recommend getting the Shingrix shot to help prevent Shingles  You can get it a pharmacy, and they can administer it there  It is a two shot series with the second shot needed between 2-6 months after the first shot  I would not recommend getting the shot before an important or fun event in case you were to have a reaction to the shot like a sore arm or flu-like symptoms  I make the same recommendation about any shot, as people can have a reaction to any shot  Mixed hyperlipidemia  Continue with healthy diet, and stay active    Benign essential hypertension  Slightly elevated, but since patient has some intermittent dizziness, will keep blood pressure meds where they are for now    Acquired hypothyroidism  Continue levothyroxine at 112 mcg daily, thyroid function testing in August was normal    Vitamin D deficiency  Last vitamin-D was low, but better than previous, continue vitamin-D supplementation    Palpitations  Present on exam today, and last EKG shows some PACs       Diagnoses and all orders for this visit:    Benign essential hypertension    Encounter for Medicare annual wellness exam    Needs flu shot  -     influenza vaccine, [id number], high-dose, PF 0 5 mL (FLUZONE HIGH-DOSE)    Acquired hypothyroidism    Vitamin D deficiency    Mixed hyperlipidemia    Palpitations          Subjective:      Patient ID: Farzaneh Downey Case is a 80 y o  female  Hypertension:  Patient reports compliance with blood pressure meds, no lightheadedness  She does get some occasional dizziness  Hypothyroidism:  Patient reports compliance with thyroid med, no fatigue, no constipation, no significant cold intolerance      Vitamin-D deficiency:  Patient reports compliance with 2000 units daily      The following portions of the patient's history were reviewed and updated as appropriate: allergies, current medications, past family history, past medical history, past social history, past surgical history and problem list     Review of Systems   Constitutional: Negative for chills, fatigue and fever  HENT: Negative for congestion, nosebleeds, postnasal drip, sore throat and trouble swallowing  Eyes: Negative for pain  Respiratory: Negative for cough, chest tightness, shortness of breath and wheezing  Cardiovascular: Positive for palpitations ( occasional)  Negative for chest pain and leg swelling  Gastrointestinal: Negative for abdominal pain, constipation, diarrhea, nausea and vomiting  Endocrine: Negative for polydipsia and polyuria  Genitourinary: Negative for dysuria, flank pain and hematuria  Musculoskeletal: Negative for arthralgias and myalgias  Skin: Negative for rash  Neurological: Positive for dizziness (Occasional)  Negative for tremors and headaches  Hematological: Does not bruise/bleed easily  Psychiatric/Behavioral: Negative for confusion and dysphoric mood  The patient is not nervous/anxious  Objective:      /90   Pulse 71   Temp 97 7 °F (36 5 °C)   Ht 5' (1 524 m)   Wt 74 1 kg (163 lb 6 4 oz)   SpO2 98%   BMI 31 91 kg/m²          Physical Exam   Constitutional: She is oriented to person, place, and time  She appears well-developed and well-nourished  No distress  HENT:   Head: Normocephalic and atraumatic  Right Ear: External ear normal    Left Ear: External ear normal    Mouth/Throat: Oropharynx is clear and moist    Eyes: No scleral icterus  Neck: Normal range of motion  Neck supple  No tracheal deviation present  No thyromegaly present  Cardiovascular: Normal rate, regular rhythm and normal heart sounds  Occasional extrasystoles are present  No murmur heard    Pulmonary/Chest: Effort normal and breath sounds normal  No respiratory distress  She has no wheezes  She has no rales  Abdominal: Soft  Bowel sounds are normal  There is no tenderness  There is no rebound and no guarding  Musculoskeletal: She exhibits no edema  Lymphadenopathy:     She has no cervical adenopathy  Neurological: She is alert and oriented to person, place, and time  Psychiatric: She has a normal mood and affect  Her behavior is normal  Judgment and thought content normal    Vitals reviewed

## 2019-11-20 DIAGNOSIS — E03.9 ACQUIRED HYPOTHYROIDISM: ICD-10-CM

## 2019-11-20 RX ORDER — LEVOTHYROXINE SODIUM 112 UG/1
112 TABLET ORAL DAILY
Qty: 90 TABLET | Refills: 3 | Status: SHIPPED | OUTPATIENT
Start: 2019-11-20

## 2019-11-24 ENCOUNTER — TELEPHONE (OUTPATIENT)
Dept: OTHER | Facility: OTHER | Age: 84
End: 2019-11-24

## 2019-11-24 NOTE — TELEPHONE ENCOUNTER
Austyn Case 1922  CONFIDENTIALTY NOTICE: This fax transmission is intended only for the addressee  It contains information that is legally privileged,  confidential or otherwise protected from use or disclosure  If you are not the intended recipient, you are strictly prohibited from reviewing,  disclosing, copying using or disseminating any of this information or taking any action in reliance on or regarding this information  If you have  received this fax in error, please notify us immediately by telephone so that we can arrange for its return to us  Page: 1  3  Call Id: [id number]  Health Call  Standard Call Report  Health Call  Patient Name: Dejah Cheek Case  Gender: Female  : 1922  Age: 80 Y 10 M 6 D  Return Phone  Number: [phone] (Home)  Address: [address] [place] [address]/[facility]/Zip: [address] [place] [address]  Practice Name: [facility]:  Physician:  Tylre Rudd Name: Juventino Gomezguilherme  Relationship To  Patient: Son  Return Phone Number: [phone] (Home)  Presenting Problem: "My mother has a productive cough  and some nasal congestion "  Service Type: Triage  Charged Service 1: N/A  Pharmacy Name and  Number:  Nurse Assessment  Nurse: Tilman Opitz RN, Ines Argueta Date/Time: 2019 9:14:10 AM  Type of assessment required:  ---General (Adult or Child)  Duration of Current S/S  ---Since yesterday morning  Location/Radiation  ---Chest / Nose  Temperature (F) and route:  ---Denies fever  Symptom Specific Meds (Dose/Time):  ---Coricidin last night  Other S/S  ---Productive cough since yesterday night  Some nasal congestion  Denies sob or  difficulty breathing  States he heard wheezing early this morning that has since  resolved  Is uncertain if the wheezing may have just been congestion (states patient  took Mucinex last night)    Pain Scale on scale of 1-10, 10 being the worst:  ---No pain  Symptom progression:  ---same  Intake and Output  Austyn Case 1922  CONFIDENTIALTY NOTICE: This fax transmission is intended only for the addressee  It contains information that is legally privileged,  confidential or otherwise protected from use or disclosure  If you are not the intended recipient, you are strictly prohibited from reviewing,  disclosing, copying using or disseminating any of this information or taking any action in reliance on or regarding this information  If you have  received this fax in error, please notify us immediately by telephone so that we can arrange for its return to us  Page: 2 of 3  Call Id: [id number]  Nurse Assessment  ---Drinking normally / WNL  Last Exam/Treatment:  ---October 2019 in the office for high BP  Protocols  Protocol Title Nurse Date/Time  Cough - Acute Raghavendra Rivera RN, Wyatt Fallon 11/24/2019 9:20:27 AM  Question Caller Affirmed  Disp  Time Disposition Final User  11/24/2019 [id number] Alpesh Herrera RN, Alray He  11/24/2019 9:31:22 AM RN Triaged Yes Jaden Slater RN, [facility] Advice Given Per Protocol  HOME CARE: You should be able to treat this at home  REASSURANCE: * It doesn't sound like a serious cough  * Coughing up  mucus is very important for protecting the lungs from pneumonia  COUGH MEDICINES: * OTC COUGH SYRUPS: The most  common cough suppressant in OTC cough medications is dextromethorphan  Often the letters 'DM' appear in the name  * OTC COUGH  DROPS: Cough drops can help a lot, especially for mild coughs  They reduce coughing by soothing your irritated throat and removing  that tickle sensation in the back of the throat  Cough drops also have the advantage of portability - you can carry them with you  *  HOME REMEDY - HARD CANDY: Hard candy works just as well as medicine-flavored OTC cough drops  People who have diabetes  should use sugar-free candy  * HOME REMEDY - HONEY: This old home remedy has been shown to help decrease coughing at  night  The adult dosage is 2 teaspoons (10 ml) at bedtime   Honey should not be given to infants under one year of age  OTC COUGH  SYRUP - DEXTROMETHORPHAN: * Cough syrups containing the cough suppressant dextromethorphan (DM) may help decrease  your cough  Cough syrups work best for coughs that keep you awake at night  They can also sometimes help in the late stages of a  respiratory infection when the cough is dry and hacking  They can be used along with cough drops  * Examples: Benylin, Robitussin  DM, Vicks 44 Cough Relief * Read the package instructions for dosage, contraindications, and other important information  CAUTION  - DEXTROMETHORPHAN: * Do not try to completely suppress coughs that produce mucus and phlegm  Remember that coughing is  helpful in bringing up mucus from the lungs and preventing pneumonia  * Research Notes: Dextromethorphan in some research studies  has been shown to reduce the frequency and severity of cough in adults (18 years or older) without significant adverse effects  However,  other studies suggest that dextromethorphan is no better than placebo at reducing a cough  - Drug Abuse Potential: It should be noted  that dextromethorphan has become a drug of abuse  This problem is seen most often in adolescents  Overdose symptoms can range  from giggling and euphoria to hallucinations and coma  * CONTRAINDICATED: Do not take dextromethorphan if you are taking  a monoamine oxidase (MAO) inhibitor now or in the past 2 weeks  Examples of MAO inhibitors include isocarboxazid (Marplan),  phenelzine (Nardil), selegiline (Eldepryl, Emsam, Zelapar), and tranylcypromine (Parnate)  Do not take dextromethorphan if you are  taking venlafaxine (Effexor)  HUMIDIFIER: If the air is dry, use a humidifier in the bedroom  (Reason: dry air makes coughs worse)  AVOID TOBACCO SMOKE: Smoking or being exposed to smoke makes coughs much worse  PREVENT DEHYDRATION: * Drink  adequate liquids  * This will help soothe an irritated or dry throat and loosen up the phlegm   EXPECTED COURSE: Viral bronchitis  causes a cough for 1 to 3 weeks  Sometimes you may cough up lots of phlegm (mucus)  The mucus can normally be white, gray, yellow  or green  CALL BACK IF: * Cough lasts over 3 weeks * Continuous coughing persists over 2 hours after cough treatment * Difficulty  breathing occurs * Fever over 103 F (39 4 C) * Fever lasts over 3 days * You become worse  CARE ADVICE given per Cough - Acute  Productive (Adult) guideline  Caller Understands: Yes  Caller Disagree/Comply: Comply  Austyn Case 5/13/1922  CONFIDENTIALTY NOTICE: This fax transmission is intended only for the addressee  It contains information that is legally privileged,  confidential or otherwise protected from use or disclosure  If you are not the intended recipient, you are strictly prohibited from reviewing,  disclosing, copying using or disseminating any of this information or taking any action in reliance on or regarding this information  If you have  received this fax in error, please notify us immediately by telephone so that we can arrange for its return to us  Page: 3 of 3  Call Id: [id number]  PreDisposition: Unsure    Also spoke with patient  Patient son would like to have patient seen in the office tomorrow  Will follow up with office in the morning

## 2019-11-25 ENCOUNTER — OFFICE VISIT (OUTPATIENT)
Dept: INTERNAL MEDICINE CLINIC | Facility: CLINIC | Age: 84
End: 2019-11-25
Payer: COMMERCIAL

## 2019-11-25 VITALS
DIASTOLIC BLOOD PRESSURE: 78 MMHG | TEMPERATURE: 98.6 F | OXYGEN SATURATION: 95 % | SYSTOLIC BLOOD PRESSURE: 142 MMHG | WEIGHT: 161 LBS | HEART RATE: 88 BPM | RESPIRATION RATE: 32 BRPM | BODY MASS INDEX: 31.61 KG/M2 | HEIGHT: 60 IN

## 2019-11-25 DIAGNOSIS — J06.9 UPPER RESPIRATORY TRACT INFECTION, UNSPECIFIED TYPE: Primary | ICD-10-CM

## 2019-11-25 PROCEDURE — 99213 OFFICE O/P EST LOW 20 MIN: CPT | Performed by: NURSE PRACTITIONER

## 2019-11-25 RX ORDER — AZITHROMYCIN 250 MG/1
TABLET, FILM COATED ORAL
Qty: 6 TABLET | Refills: 0 | Status: SHIPPED | OUTPATIENT
Start: 2019-11-25 | End: 2019-11-29

## 2019-11-25 NOTE — PROGRESS NOTES
Assessment/Plan:    Upper respiratory tract infection  Start antibiotics  Drink plenty of fluids and rest  May take over the counter mucinex and cough syrup/cough drops  Call if worsening  Diagnoses and all orders for this visit:    Upper respiratory tract infection, unspecified type  -     azithromycin (ZITHROMAX) 250 mg tablet; Take 2 tablets today then 1 tablet daily x 4 days          Subjective:      Patient ID: Krysten Fairchild is a 80 y o  female  Patient complains of cough, wheezing, congestion, PND, since Friday  No fever or chills  No sob      The following portions of the patient's history were reviewed and updated as appropriate: allergies, current medications, past family history, past medical history, past social history, past surgical history and problem list     Review of Systems   Constitutional: Positive for fatigue  HENT: Positive for congestion, postnasal drip and rhinorrhea  Eyes: Negative  Respiratory: Positive for cough  Cardiovascular: Negative  Gastrointestinal: Negative  Musculoskeletal: Negative  Neurological: Negative  Objective:      /78   Pulse 88   Temp 98 6 °F (37 °C) (Tympanic)   Resp (!) 32   Ht 5' (1 524 m)   Wt 73 kg (161 lb)   SpO2 95%   BMI 31 44 kg/m²          Physical Exam   Constitutional: She is oriented to person, place, and time  She appears well-developed and well-nourished  HENT:   Head: Normocephalic and atraumatic  Right Ear: External ear normal    Left Ear: External ear normal    Nose: Nose normal    Mouth/Throat: Oropharynx is clear and moist    Eyes: Pupils are equal, round, and reactive to light  Conjunctivae are normal    Neck: Normal range of motion  Neck supple  Cardiovascular: Normal rate and regular rhythm  Pulmonary/Chest: Effort normal and breath sounds normal    Abdominal: Soft  Bowel sounds are normal    Musculoskeletal: Normal range of motion     Neurological: She is alert and oriented to person, place, and time  Skin: Skin is warm and dry  Nursing note and vitals reviewed

## 2020-02-17 ENCOUNTER — OFFICE VISIT (OUTPATIENT)
Dept: INTERNAL MEDICINE CLINIC | Facility: CLINIC | Age: 85
End: 2020-02-17
Payer: COMMERCIAL

## 2020-02-17 VITALS
WEIGHT: 160 LBS | HEIGHT: 60 IN | OXYGEN SATURATION: 96 % | HEART RATE: 69 BPM | BODY MASS INDEX: 31.41 KG/M2 | DIASTOLIC BLOOD PRESSURE: 82 MMHG | TEMPERATURE: 98.1 F | RESPIRATION RATE: 16 BRPM | SYSTOLIC BLOOD PRESSURE: 128 MMHG

## 2020-02-17 DIAGNOSIS — I10 BENIGN ESSENTIAL HYPERTENSION: Primary | ICD-10-CM

## 2020-02-17 DIAGNOSIS — E78.2 MIXED HYPERLIPIDEMIA: ICD-10-CM

## 2020-02-17 DIAGNOSIS — E55.9 VITAMIN D DEFICIENCY: ICD-10-CM

## 2020-02-17 DIAGNOSIS — E03.9 ACQUIRED HYPOTHYROIDISM: ICD-10-CM

## 2020-02-17 PROBLEM — J06.9 UPPER RESPIRATORY TRACT INFECTION: Status: RESOLVED | Noted: 2019-11-25 | Resolved: 2020-02-17

## 2020-02-17 PROCEDURE — 3074F SYST BP LT 130 MM HG: CPT | Performed by: INTERNAL MEDICINE

## 2020-02-17 PROCEDURE — 1036F TOBACCO NON-USER: CPT | Performed by: INTERNAL MEDICINE

## 2020-02-17 PROCEDURE — 1101F PT FALLS ASSESS-DOCD LE1/YR: CPT | Performed by: INTERNAL MEDICINE

## 2020-02-17 PROCEDURE — 1160F RVW MEDS BY RX/DR IN RCRD: CPT | Performed by: INTERNAL MEDICINE

## 2020-02-17 PROCEDURE — 99214 OFFICE O/P EST MOD 30 MIN: CPT | Performed by: INTERNAL MEDICINE

## 2020-02-17 PROCEDURE — 3008F BODY MASS INDEX DOCD: CPT | Performed by: INTERNAL MEDICINE

## 2020-02-17 PROCEDURE — 4040F PNEUMOC VAC/ADMIN/RCVD: CPT | Performed by: INTERNAL MEDICINE

## 2020-02-17 PROCEDURE — 3288F FALL RISK ASSESSMENT DOCD: CPT | Performed by: INTERNAL MEDICINE

## 2020-02-17 PROCEDURE — 3079F DIAST BP 80-89 MM HG: CPT | Performed by: INTERNAL MEDICINE

## 2020-02-17 RX ORDER — CARVEDILOL PHOSPHATE 20 MG/1
20 CAPSULE, EXTENDED RELEASE ORAL DAILY
Qty: 90 CAPSULE | Refills: 3 | Status: SHIPPED | OUTPATIENT
Start: 2020-02-17 | End: 2020-02-21

## 2020-02-17 NOTE — ASSESSMENT & PLAN NOTE
Add 79423 Cpt? (Important Note: In 2017 The Use Of 89964 Is Being Tracked By Cms To Determine Future Global Period Reimbursement For Global Periods): no
Continue med, last thyroid function tests ok in August
Continue vitamin-D
Exam today normal, no ectopy
Son reports possible compliance issues with the twice a day carvedilol in asking if there is a once a day medication for this  Will try carvedilol CR 20 mg daily 
Detail Level: Detailed

## 2020-02-17 NOTE — PATIENT INSTRUCTIONS
Problem List Items Addressed This Visit        Endocrine    Acquired hypothyroidism     Continue med, last thyroid function tests ok in August         Relevant Medications    carvedilol (COREG CR) 20 MG 24 hr capsule    Other Relevant Orders    TSH, 3rd generation with Free T4 reflex       Cardiovascular and Mediastinum    Benign essential hypertension - Primary     Son reports possible compliance issues with the twice a day carvedilol in asking if there is a once a day medication for this  Will try carvedilol CR 20 mg daily           Relevant Medications    carvedilol (COREG CR) 20 MG 24 hr capsule       Other    Mixed hyperlipidemia    Relevant Orders    CBC and differential    Lipid Panel with Direct LDL reflex    Comprehensive metabolic panel    Vitamin D deficiency     Continue vitamin-D

## 2020-02-17 NOTE — PROGRESS NOTES
Assessment/Plan:    Benign essential hypertension  Son reports possible compliance issues with the twice a day carvedilol in asking if there is a once a day medication for this  Will try carvedilol CR 20 mg daily  Acquired hypothyroidism  Continue med, last thyroid function tests ok in August    Vitamin D deficiency  Continue vitamin-D    Palpitations  Exam today normal, no ectopy       Diagnoses and all orders for this visit:    Benign essential hypertension  -     carvedilol (COREG CR) 20 MG 24 hr capsule; Take 1 capsule (20 mg total) by mouth daily    Acquired hypothyroidism  -     TSH, 3rd generation with Free T4 reflex; Future    Vitamin D deficiency    Mixed hyperlipidemia  -     CBC and differential; Future  -     Lipid Panel with Direct LDL reflex; Future  -     Comprehensive metabolic panel; Future        BMI Counseling: Body mass index is 31 25 kg/m²  The BMI is above normal  Nutrition recommendations include encouraging healthy choices of fruits and vegetables and moderation in carbohydrate intake  Exercise recommendations include exercising 3-5 times per week  Subjective:      Patient ID: Jarrett Early Case is a 80 y o  female  Hypothyroidism:  Patient reports compliance with medication, no fatigue, no constipation, no cold intolerance  Hypertension:  Patient reports compliance with meds, no lightheadedness  Vitamin-D deficiency:  Patient on vitamin-D      The following portions of the patient's history were reviewed and updated as appropriate: allergies, current medications, past family history, past medical history, past social history, past surgical history and problem list     Review of Systems   Constitutional: Negative for chills, fatigue and fever  HENT: Negative for congestion, nosebleeds, postnasal drip, sore throat and trouble swallowing  Eyes: Negative for pain  Respiratory: Negative for cough, chest tightness, shortness of breath and wheezing      Cardiovascular: Negative for chest pain, palpitations and leg swelling  Gastrointestinal: Negative for abdominal pain, constipation, diarrhea, nausea and vomiting  Endocrine: Negative for cold intolerance, polydipsia and polyuria  Genitourinary: Negative for dysuria, flank pain and hematuria  Musculoskeletal: Negative for arthralgias  Skin: Negative for rash  Neurological: Negative for dizziness, tremors and headaches  Hematological: Does not bruise/bleed easily  Psychiatric/Behavioral: Negative for confusion and dysphoric mood  The patient is not nervous/anxious  Objective:      /82   Pulse 69   Temp 98 1 °F (36 7 °C) (Oral)   Resp 16   Ht 5' (1 524 m)   Wt 72 6 kg (160 lb)   SpO2 96%   BMI 31 25 kg/m²          Physical Exam   Constitutional: She is oriented to person, place, and time  She appears well-developed and well-nourished  No distress  HENT:   Head: Normocephalic and atraumatic  Right Ear: External ear normal    Left Ear: External ear normal    Eyes: No scleral icterus  Conjunctiva red   Neck: Normal range of motion  Neck supple  No tracheal deviation present  No thyromegaly present  Cardiovascular: Normal rate, regular rhythm and normal heart sounds  No murmur heard  Pulmonary/Chest: Effort normal and breath sounds normal  No respiratory distress  She has no wheezes  She has no rales  Abdominal: Soft  Bowel sounds are normal  There is no tenderness  There is no rebound and no guarding  Musculoskeletal: She exhibits no edema  Lymphadenopathy:     She has no cervical adenopathy  Neurological: She is alert and oriented to person, place, and time  Psychiatric: She has a normal mood and affect  Her behavior is normal  Judgment and thought content normal    Vitals reviewed

## 2020-02-20 ENCOUNTER — TELEPHONE (OUTPATIENT)
Dept: INTERNAL MEDICINE CLINIC | Facility: CLINIC | Age: 85
End: 2020-02-20

## 2020-02-20 NOTE — TELEPHONE ENCOUNTER
Patient's son is calling to let you know that the carvedilol 20 mg is not covered by the patient's insurance  It would cost the patient $215  Son is asking if you want to send an alternative medication or if she should just take the carvedilol 12 5 mg she was originally prescribed      Please advise

## 2020-02-21 DIAGNOSIS — I10 BENIGN ESSENTIAL HYPERTENSION: ICD-10-CM

## 2020-02-21 DIAGNOSIS — I10 BENIGN ESSENTIAL HYPERTENSION: Primary | ICD-10-CM

## 2020-02-21 RX ORDER — METOPROLOL SUCCINATE 100 MG/1
100 TABLET, EXTENDED RELEASE ORAL DAILY
Qty: 30 TABLET | Refills: 5 | Status: SHIPPED | OUTPATIENT
Start: 2020-02-21 | End: 2020-09-01

## 2020-02-21 RX ORDER — METOPROLOL SUCCINATE 100 MG/1
100 TABLET, EXTENDED RELEASE ORAL DAILY
Qty: 30 TABLET | Refills: 5 | Status: CANCELLED | OUTPATIENT
Start: 2020-02-21

## 2020-02-21 RX ORDER — METOPROLOL SUCCINATE 100 MG/1
100 TABLET, EXTENDED RELEASE ORAL DAILY
Qty: 30 TABLET | Refills: 5 | Status: SHIPPED | OUTPATIENT
Start: 2020-02-21 | End: 2020-02-21 | Stop reason: SDUPTHER

## 2020-02-27 NOTE — TELEPHONE ENCOUNTER
The patient's son would like to know if 100mg of metoprolol equivalent to 25mg of carvedilol  Please advise  Thank you

## 2020-05-08 ENCOUNTER — OFFICE VISIT (OUTPATIENT)
Dept: INTERNAL MEDICINE CLINIC | Facility: CLINIC | Age: 85
End: 2020-05-08
Payer: COMMERCIAL

## 2020-05-08 ENCOUNTER — TELEPHONE (OUTPATIENT)
Dept: OTHER | Facility: OTHER | Age: 85
End: 2020-05-08

## 2020-05-08 VITALS
HEIGHT: 60 IN | TEMPERATURE: 97.7 F | DIASTOLIC BLOOD PRESSURE: 74 MMHG | WEIGHT: 160 LBS | BODY MASS INDEX: 31.41 KG/M2 | SYSTOLIC BLOOD PRESSURE: 140 MMHG | HEART RATE: 73 BPM

## 2020-05-08 DIAGNOSIS — R41.89 COGNITIVE IMPAIRMENT: Primary | ICD-10-CM

## 2020-05-08 DIAGNOSIS — I10 BENIGN ESSENTIAL HYPERTENSION: ICD-10-CM

## 2020-05-08 DIAGNOSIS — E03.9 ACQUIRED HYPOTHYROIDISM: ICD-10-CM

## 2020-05-08 DIAGNOSIS — E55.9 VITAMIN D DEFICIENCY: ICD-10-CM

## 2020-05-08 PROCEDURE — 99214 OFFICE O/P EST MOD 30 MIN: CPT | Performed by: INTERNAL MEDICINE

## 2020-05-08 PROCEDURE — 3078F DIAST BP <80 MM HG: CPT | Performed by: INTERNAL MEDICINE

## 2020-05-08 PROCEDURE — 3008F BODY MASS INDEX DOCD: CPT | Performed by: INTERNAL MEDICINE

## 2020-05-08 PROCEDURE — 4040F PNEUMOC VAC/ADMIN/RCVD: CPT | Performed by: INTERNAL MEDICINE

## 2020-05-08 PROCEDURE — 3077F SYST BP >= 140 MM HG: CPT | Performed by: INTERNAL MEDICINE

## 2020-05-08 PROCEDURE — 1036F TOBACCO NON-USER: CPT | Performed by: INTERNAL MEDICINE

## 2020-05-08 PROCEDURE — 1160F RVW MEDS BY RX/DR IN RCRD: CPT | Performed by: INTERNAL MEDICINE

## 2020-05-12 ENCOUNTER — APPOINTMENT (OUTPATIENT)
Dept: LAB | Facility: CLINIC | Age: 85
End: 2020-05-12
Payer: COMMERCIAL

## 2020-05-12 DIAGNOSIS — R41.89 COGNITIVE IMPAIRMENT: ICD-10-CM

## 2020-05-12 DIAGNOSIS — E78.2 MIXED HYPERLIPIDEMIA: ICD-10-CM

## 2020-05-12 DIAGNOSIS — E03.9 ACQUIRED HYPOTHYROIDISM: ICD-10-CM

## 2020-05-12 LAB
ALBUMIN SERPL BCP-MCNC: 3.3 G/DL (ref 3.5–5)
ALP SERPL-CCNC: 87 U/L (ref 46–116)
ALT SERPL W P-5'-P-CCNC: 23 U/L (ref 12–78)
ANION GAP SERPL CALCULATED.3IONS-SCNC: 7 MMOL/L (ref 4–13)
AST SERPL W P-5'-P-CCNC: 21 U/L (ref 5–45)
BASOPHILS # BLD AUTO: 0.06 THOUSANDS/ΜL (ref 0–0.1)
BASOPHILS NFR BLD AUTO: 1 % (ref 0–1)
BILIRUB SERPL-MCNC: 1.33 MG/DL (ref 0.2–1)
BUN SERPL-MCNC: 20 MG/DL (ref 5–25)
CALCIUM SERPL-MCNC: 9.4 MG/DL (ref 8.3–10.1)
CHLORIDE SERPL-SCNC: 104 MMOL/L (ref 100–108)
CHOLEST SERPL-MCNC: 212 MG/DL (ref 50–200)
CO2 SERPL-SCNC: 27 MMOL/L (ref 21–32)
CREAT SERPL-MCNC: 0.85 MG/DL (ref 0.6–1.3)
EOSINOPHIL # BLD AUTO: 0.2 THOUSAND/ΜL (ref 0–0.61)
EOSINOPHIL NFR BLD AUTO: 3 % (ref 0–6)
ERYTHROCYTE [DISTWIDTH] IN BLOOD BY AUTOMATED COUNT: 16.1 % (ref 11.6–15.1)
GFR SERPL CREATININE-BSD FRML MDRD: 58 ML/MIN/1.73SQ M
GLUCOSE P FAST SERPL-MCNC: 98 MG/DL (ref 65–99)
HCT VFR BLD AUTO: 40.4 % (ref 34.8–46.1)
HDLC SERPL-MCNC: 49 MG/DL
HGB BLD-MCNC: 12 G/DL (ref 11.5–15.4)
IMM GRANULOCYTES # BLD AUTO: 0.01 THOUSAND/UL (ref 0–0.2)
IMM GRANULOCYTES NFR BLD AUTO: 0 % (ref 0–2)
LDLC SERPL CALC-MCNC: 137 MG/DL (ref 0–100)
LYMPHOCYTES # BLD AUTO: 1.78 THOUSANDS/ΜL (ref 0.6–4.47)
LYMPHOCYTES NFR BLD AUTO: 28 % (ref 14–44)
MCH RBC QN AUTO: 27 PG (ref 26.8–34.3)
MCHC RBC AUTO-ENTMCNC: 29.7 G/DL (ref 31.4–37.4)
MCV RBC AUTO: 91 FL (ref 82–98)
MONOCYTES # BLD AUTO: 0.47 THOUSAND/ΜL (ref 0.17–1.22)
MONOCYTES NFR BLD AUTO: 7 % (ref 4–12)
NEUTROPHILS # BLD AUTO: 3.84 THOUSANDS/ΜL (ref 1.85–7.62)
NEUTS SEG NFR BLD AUTO: 61 % (ref 43–75)
NRBC BLD AUTO-RTO: 0 /100 WBCS
PLATELET # BLD AUTO: 196 THOUSANDS/UL (ref 149–390)
PMV BLD AUTO: 9.8 FL (ref 8.9–12.7)
POTASSIUM SERPL-SCNC: 3.9 MMOL/L (ref 3.5–5.3)
PROT SERPL-MCNC: 7.5 G/DL (ref 6.4–8.2)
RBC # BLD AUTO: 4.45 MILLION/UL (ref 3.81–5.12)
SODIUM SERPL-SCNC: 138 MMOL/L (ref 136–145)
TRIGL SERPL-MCNC: 129 MG/DL
TSH SERPL DL<=0.05 MIU/L-ACNC: 1.03 UIU/ML (ref 0.36–3.74)
VIT B12 SERPL-MCNC: 213 PG/ML (ref 100–900)
WBC # BLD AUTO: 6.36 THOUSAND/UL (ref 4.31–10.16)

## 2020-05-12 PROCEDURE — 84443 ASSAY THYROID STIM HORMONE: CPT

## 2020-05-12 PROCEDURE — 80053 COMPREHEN METABOLIC PANEL: CPT

## 2020-05-12 PROCEDURE — 36415 COLL VENOUS BLD VENIPUNCTURE: CPT

## 2020-05-12 PROCEDURE — 82306 VITAMIN D 25 HYDROXY: CPT

## 2020-05-12 PROCEDURE — 85025 COMPLETE CBC W/AUTO DIFF WBC: CPT

## 2020-05-12 PROCEDURE — 80061 LIPID PANEL: CPT

## 2020-05-12 PROCEDURE — 82607 VITAMIN B-12: CPT

## 2020-05-13 LAB — 25(OH)D3 SERPL-MCNC: 37.2 NG/ML (ref 30–100)

## 2020-06-02 ENCOUNTER — TELEPHONE (OUTPATIENT)
Dept: INTERNAL MEDICINE CLINIC | Facility: CLINIC | Age: 85
End: 2020-06-02

## 2020-06-23 DIAGNOSIS — I10 BENIGN ESSENTIAL HYPERTENSION: ICD-10-CM

## 2020-06-23 RX ORDER — LOSARTAN POTASSIUM 50 MG/1
TABLET ORAL
Qty: 90 TABLET | Refills: 3 | Status: SHIPPED | OUTPATIENT
Start: 2020-06-23 | End: 2020-06-24 | Stop reason: RX

## 2020-06-24 DIAGNOSIS — I10 BENIGN ESSENTIAL HYPERTENSION: ICD-10-CM

## 2020-06-24 DIAGNOSIS — I10 BENIGN ESSENTIAL HYPERTENSION: Primary | ICD-10-CM

## 2020-06-24 RX ORDER — IRBESARTAN 75 MG/1
75 TABLET ORAL
Qty: 30 TABLET | Refills: 5 | Status: SHIPPED | OUTPATIENT
Start: 2020-06-24

## 2020-06-24 RX ORDER — LOSARTAN POTASSIUM 50 MG/1
TABLET ORAL
Qty: 90 TABLET | Refills: 3 | Status: SHIPPED | OUTPATIENT
Start: 2020-06-24

## 2020-07-03 LAB — EXT SARS-COV-2: NOT DETECTED

## 2020-07-24 ENCOUNTER — IN HOME VISIT (OUTPATIENT)
Dept: GERIATRICS | Facility: OTHER | Age: 85
End: 2020-07-24
Payer: COMMERCIAL

## 2020-07-24 DIAGNOSIS — R00.2 PALPITATIONS: ICD-10-CM

## 2020-07-24 DIAGNOSIS — E55.9 VITAMIN D DEFICIENCY: ICD-10-CM

## 2020-07-24 DIAGNOSIS — E03.9 ACQUIRED HYPOTHYROIDISM: ICD-10-CM

## 2020-07-24 DIAGNOSIS — H35.3190 NONEXUDATIVE AGE-RELATED MACULAR DEGENERATION, UNSPECIFIED LATERALITY, UNSPECIFIED STAGE: ICD-10-CM

## 2020-07-24 DIAGNOSIS — E78.2 MIXED HYPERLIPIDEMIA: ICD-10-CM

## 2020-07-24 DIAGNOSIS — I10 BENIGN ESSENTIAL HYPERTENSION: Primary | ICD-10-CM

## 2020-07-24 DIAGNOSIS — K92.2 LOWER GI BLEED: ICD-10-CM

## 2020-07-24 DIAGNOSIS — R41.89 COGNITIVE IMPAIRMENT: ICD-10-CM

## 2020-07-24 PROCEDURE — 99337 PR DOM/R-HOME E/M EST PT SIGNIF NEW PROB 60 MINUTES: CPT | Performed by: NURSE PRACTITIONER

## 2020-07-24 NOTE — PROGRESS NOTES
[address] [facility] [address]  (008) [address]   History and Physical  POS 13        NAME: Austyn MARROQUIN Case  AGE: 80 y o  SEX: female  :  1922  DATE OF ENCOUNTER: 2020    Chief Complaint   Patient seen and examined for admission to personal care at [facility]    History of Present Illness     Austyn Fairchild is a 80year old female patient with Essential HTN, Hypothyroidism, HLD, Vitamin D Deficiency, Macular Degeneration and Cognitive Impairment seen and examined today to establish care and follow-up on acute and chronic medical conditions  She recently moved to [facility] personal care from home on 2020  Upon examination in her room, patient is pleasant, cooperative and in no acute distress  She is without complaint, she denies chest pain, sob, abdominal pain, nausea, vomiting, diarrhea, headache, dizziness or visual disturbance  Nursing reports no current problems, she was positive for sob and wheezing on 2020 with reported improvement with a VERÓNICA inhaler  She ambulates with a cane, her mood is stable and she has a good appetite  The following portions of the patient's history were reviewed and updated as appropriate: allergies, current medications, past family history, past medical history, past social history, past surgical history and problem list     Review of Systems     A review of systems was performed  All negative, except as per HPI      History     Past Medical History:   Diagnosis Date    Cognitive impairment     Hyperlipidemia     Hypertension     Hypothyroidism     Macular degeneration     Vitamin D deficiency      Past Surgical History:   Procedure Laterality Date    APPENDECTOMY       SECTION      COLONOSCOPY N/A 3/31/2019    Procedure: COLONOSCOPY;  Surgeon: Ramin Cox MD;  [facility];  Service: Gastroenterology    ESOPHAGOGASTRODUODENOSCOPY N/A 3/31/2019    Procedure: ESOPHAGOGASTRODUODENOSCOPY (EGD); Surgeon: Marguerite Abbasi MD;  [facility];  Service: Gastroenterology    GALLBLADDER SURGERY       Family History   Problem Relation Age of Onset    Lung cancer Mother     Diabetes Father         mellitus    Atrial fibrillation Son      Social History     Socioeconomic History    Marital status:       Spouse name: None    Number of children: None    Years of education: None    Highest education level: None   Occupational History    Occupation: retired   Social Needs    Financial resource strain: None    Food insecurity:     Worry: None     Inability: None    Transportation needs:     Medical: None     Non-medical: None   Tobacco Use    Smoking status: Former Smoker    Smokeless tobacco: Never Used    Tobacco comment: never  smoker ( as per allscripts)   Substance and Sexual Activity    Alcohol use: No    Drug use: No    Sexual activity: None   Lifestyle    Physical activity:     Days per week: None     Minutes per session: None    Stress: None   Relationships    Social connections:     Talks on phone: None     Gets together: None     Attends [employer] service: None     Active member of club or organization: None     Attends meetings of clubs or organizations: None     Relationship status: None    Intimate partner violence:     Fear of current or ex partner: None     Emotionally abused: None     Physically abused: None     Forced sexual activity: None   Other Topics Concern    None   Social History Narrative    Lives with adult children     No Known Allergies    Objective     Vital Signs  BP: 131/71       HR: 68 T: 98 2    RR: 18 O2Sat: 94% RA W:165 lbs (07/01/2020)  General: NAD, Well Nourished, Well Developed  Oral: Oropharynx Moist and Clear  Neck: Supple, +ROM  Eyes: Conjuctivae are normal  No scleral icterus  + Ectropion of bilateral eye lids  CV: S1, S2, normal rate, regular rhythm, no murmur appreciated  Pulmonary: Lung sounds clear to air, no wheezing, rhonchi, rales  Abdominal:BS + x4 in all quadrants, soft, no mass, no tenderness  Extremities: No edema, +ROM, +Strength  Skin: Warm, Dry, no lesions, no rash, no erythema present, no ecchymosis present  Neurological: CN 2-12 intact, PERRLA  Psych: Alert and oriented times person and place, disoriented to time, no mood, no affect, Forgetful, able to follow simple step commands and answer questions appropriately    Pertinent Laboratory/Diagnostic Studies:  07/03/2020: COVID-19: Not Detected      Current Medications     Current Medications Reviewed and updated in Nursing Home EMR  Assessment and Plan     Benign essential hypertension  - BP currently stable  - Per records reviewed, in the past she was changed from metoprolol to carvedilol due to hallucinations in 2018 but was placed back on metoprolol, most likely during hospitalization  She continues on Metoprolol with no hallucinations reported      - Will continue to monitor  - BMP ordered for August 2020    Acquired hypothyroidism  - TSH level stable at 1 03 on 05/12/2020  - Continue Levothyroxine 112 mcg daily  - Will continue to monitor TSH level    Lower GI bleed  - Patient hospitalized in 03/2019  with GI Bleed  - GI workup showed Diverticulitis and suspected Gastritis  - No signs or symptoms of bleed reported  - Will monitor CBC    Mixed hyperlipidemia  - Lipid panel reviewed, Cholesterol 212,   - Continue lifestyle management with diet and lifestyle    Palpitations  - Palpitations reported in the past, no complaint today  - No Ectopy on examination    Vitamin D deficiency  - Vitamin D level reported to very low in the past  - Continue vitamin D level 37 2 on 05/12/2020  - Continue Vitamin D supplementation  - Will monitor Vitamin D level    Cognitive impairment  - Currently stable, no mood or behaviors reported  - Gradual onset, age suspected, related dementia reported  - Depression a contributing factor in the past with loss of   - Continue supportive care  - Delirium precautions advised    Macular degeneration  - Continue Systane eye drops  - Follow-up with Ophthalmology      [address] [facility]  07/24/2020

## 2020-07-25 PROBLEM — H35.30 MACULAR DEGENERATION: Status: ACTIVE | Noted: 2020-07-24

## 2020-07-25 NOTE — ASSESSMENT & PLAN NOTE
- Currently stable, no mood or behaviors reported  - Gradual onset, age suspected, related dementia reported  - Depression a contributing factor in the past with loss of   - Continue supportive care  - Delirium precautions advised

## 2020-07-25 NOTE — ASSESSMENT & PLAN NOTE
- BP currently stable  - Per records reviewed, in the past she was changed from metoprolol to carvedilol due to hallucinations in 2018 but was placed back on metoprolol, most likely during hospitalization  She continues on Metoprolol with no hallucinations reported      - Will continue to monitor  - BMP ordered for August 2020

## 2020-07-25 NOTE — ASSESSMENT & PLAN NOTE
- Vitamin D level reported to very low in the past  - Continue vitamin D level 37 2 on 05/12/2020  - Continue Vitamin D supplementation  - Will monitor Vitamin D level

## 2020-07-25 NOTE — ASSESSMENT & PLAN NOTE
- Patient hospitalized in 03/2019  with GI Bleed  - GI workup showed Diverticulitis and suspected Gastritis  - No signs or symptoms of bleed reported  - Will monitor CBC

## 2020-07-25 NOTE — ASSESSMENT & PLAN NOTE
- TSH level stable at 1 03 on 05/12/2020  - Continue Levothyroxine 112 mcg daily  - Will continue to monitor TSH level

## 2020-09-01 RX ORDER — ALBUTEROL SULFATE 90 UG/1
AEROSOL, METERED RESPIRATORY (INHALATION)
COMMUNITY
Start: 2020-07-07 | End: 2021-07-16 | Stop reason: ALTCHOICE

## 2020-09-01 RX ORDER — ACETAMINOPHEN 325 MG/1
TABLET ORAL EVERY 6 HOURS PRN
COMMUNITY

## 2020-09-04 ENCOUNTER — IN HOME VISIT (OUTPATIENT)
Dept: GERIATRICS | Facility: OTHER | Age: 85
End: 2020-09-04
Payer: COMMERCIAL

## 2020-09-04 DIAGNOSIS — R09.89 ABNORMAL LUNG SOUNDS: Primary | ICD-10-CM

## 2020-09-04 DIAGNOSIS — R63.5 WEIGHT GAIN: ICD-10-CM

## 2020-09-04 PROCEDURE — 99335 PR DOM/R-HOME E/M EST PT LW MOD SEVERITY 25 MINUTES: CPT | Performed by: NURSE PRACTITIONER

## 2020-09-04 NOTE — PROGRESS NOTES
[address]  [address]  (754) [address]   Acute Visit Note  POS 13        NAME: Austyn MARROQUIN Case  AGE: 80 y o  SEX: female  :  1922  DATE OF ENCOUNTER: 2020    Chief Complaint   Patient seen and examined for abnormal lung sounds  and weight gain    History of Present Illness     Austyn Fairchild is a 80year old female patient of [facility] personal care unit with essential HTN, hypothryroidism, cognitive impairment, macular degeneration, vitamin D deficiency seen and examined today per nursing request for abnormal lung sounds and weight gain  Nursing noted that this morning, the patient was experiencing increased moist respirations at rest   The patent states that she was looking for something in her purse and was it was causing her some anxiety  She is also noted to have a 5 pound weight gain since admission in 2020  Upon examination, patient is sitting calmly in her chair, she is in no acute distress  She denies chest pain, sob, cough, wheezing, abdominal pain, nausea, vomiting, diarrhea, headache, dizziness or visual disturbance  The following portions of the patient's history were reviewed and updated as appropriate: allergies, current medications, past family history, past medical history, past social history, past surgical history and problem list     Review of Systems     A review of systems was performed  All negative, except as per HPI      History     Past Medical History:   Diagnosis Date    Cognitive impairment     Hyperlipidemia     Hypertension     Hypothyroidism     Macular degeneration     Vitamin D deficiency      Past Surgical History:   Procedure Laterality Date    APPENDECTOMY       SECTION      COLONOSCOPY N/A 3/31/2019    Procedure: COLONOSCOPY;  Surgeon: Jessica Parrish MD;  [facility];  Service: Gastroenterology    ESOPHAGOGASTRODUODENOSCOPY N/A 3/31/2019    Procedure: ESOPHAGOGASTRODUODENOSCOPY (EGD); Surgeon: Jon Nelson MD;  [facility];  Service: Gastroenterology    GALLBLADDER SURGERY       Family History   Problem Relation Age of Onset    Lung cancer Mother     Diabetes Father         mellitus    Atrial fibrillation Son      Social History     Socioeconomic History    Marital status:       Spouse name: Not on file    Number of children: Not on file    Years of education: Not on file    Highest education level: Not on file   Occupational History    Occupation: retired   Social Needs    Financial resource strain: Not on file    Food insecurity     Worry: Not on file     Inability: Not on file   [employer] needs     Medical: Not on file     Non-medical: Not on file   Tobacco Use    Smoking status: Former Smoker    Smokeless tobacco: Never Used    Tobacco comment: never  smoker ( as per allscripts)   Substance and Sexual Activity    Alcohol use: No    Drug use: No    Sexual activity: Not on file   Lifestyle    Physical activity     Days per week: Not on file     Minutes per session: Not on file    Stress: Not on file   Relationships    Social connections     Talks on phone: Not on file     Gets together: Not on file     Attends Latter day service: Not on file     Active member of club or organization: Not on file     Attends meetings of clubs or organizations: Not on file     Relationship status: Not on file    Intimate partner violence     Fear of current or ex partner: Not on file     Emotionally abused: Not on file     Physically abused: Not on file     Forced sexual activity: Not on file   Other Topics Concern    Not on file   Social History Narrative    Lives with adult children     No Known Allergies    Objective     Vital Signs  BP: 120/60      HR: 88  T: 97 3     RR: 22  O2Sat: 94% RA W: 170 lbs  General: NAD, Well nourished, Non-toxic appearing  Oral: Oropharynx Moist and Clear  CV: S1, S2, normal rate, regular rhythm, no murmur appreciated  Pulmonary: + Crackles in the bilateral upper lobes, no wheezing noted  Abdominal:BS + x4 in all quadrants, soft, no mass, no tenderness  Extremities: No edema, +ROM, +Strength  Skin: Warm, Dry  Psych: Awake and alert, no mood, no affect,    Pertinent Laboratory/Diagnostic Studies:  Reviewed in nursing home medical chart      Current Medications     Current Medications Reviewed and updated in Nursing Home EMR      Assessment and Plan     Abnormal lung sounds  - Fine crackles her in the bilateral apices of the lungs  - Will order CXR stat to rule out CHF or PNA  - Monitor vital signs bid for 3 days    Weight gain  - Five pound weight gain in 2 months  - Patient does not have history of CHF  - Will monitor daily weights daily for one week with printout      [address] [facility]  09/04/2020

## 2020-09-04 NOTE — ASSESSMENT & PLAN NOTE
- Five pound weight gain in 2 months  - Patient does not have history of CHF  - Will monitor daily weights daily for one week with printout

## 2020-09-04 NOTE — ASSESSMENT & PLAN NOTE
- Fine crackles her in the bilateral apices of the lungs  - Will order CXR stat to rule out CHF or PNA  - Monitor vital signs bid for 3 days

## 2020-12-02 LAB — EXT SARS-COV-2: NOT DETECTED

## 2020-12-09 LAB — EXT SARS-COV-2: NOT DETECTED

## 2020-12-15 LAB — EXT SARS-COV-2: NOT DETECTED

## 2020-12-21 LAB — EXT SARS-COV-2: NOT DETECTED

## 2020-12-28 LAB — EXT SARS-COV-2: NOT DETECTED

## 2021-01-06 NOTE — PROGRESS NOTES
Assessment and Plan:     Problem List Items Addressed This Visit        Other    Encounter for Medicare annual wellness exam - Primary     Discussed preventative health, cancer screening, immunizations, and safety issues  I recommend flu shot today  I recommend Adacel vaccination at the pharmacy  I recommend getting the Shingrix shot to help prevent Shingles  You can get it a pharmacy, and they can administer it there  It is a two shot series with the second shot needed between 2-6 months after the first shot  I would not recommend getting the shot before an important or fun event in case you were to have a reaction to the shot like a sore arm or flu-like symptoms  I make the same recommendation about any shot, as people can have a reaction to any shot  Other Visit Diagnoses     Needs flu shot        Relevant Orders    influenza vaccine, 8649-1450, high-dose, PF 0 5 mL (FLUZONE HIGH-DOSE)           Preventive health issues were discussed with patient, and age appropriate screening tests were ordered as noted in patient's After Visit Summary  Personalized health advice and appropriate referrals for health education or preventive services given if needed, as noted in patient's After Visit Summary       History of Present Illness:     Patient presents for Medicare Annual Wellness visit    Patient Care Team:  Tom Hull MD as PCP - MD Azra Seth MD Sharlett Dear, MD as Endoscopist     Problem List:     Patient Active Problem List   Diagnosis    Benign essential hypertension    Acquired hypothyroidism    Mixed hyperlipidemia    Palpitations    Vitamin D deficiency    Encounter for Medicare annual wellness exam    Lower GI bleed    Cough      Past Medical and Surgical History:     Past Medical History:   Diagnosis Date    Hypertension      Past Surgical History:   Procedure Laterality Date    APPENDECTOMY       SECTION      COLONOSCOPY N/A 3/31/2019 Pt came in for Depo injection. Injection was given in the left deltoid without complication. Pt was informed that next injection is due between 03/24-04/07/21. Pt is currently in stable condition. No adverse effects noted.    Procedure: COLONOSCOPY;  Surgeon: eGetha Ford MD;  [facility];  Service: Gastroenterology    ESOPHAGOGASTRODUODENOSCOPY N/A 3/31/2019    Procedure: ESOPHAGOGASTRODUODENOSCOPY (EGD); Surgeon: Geetha Ford MD;  [facility];  Service: Gastroenterology    GALLBLADDER SURGERY        Family History:     Family History   Problem Relation Age of Onset    Lung cancer Mother     Diabetes Father         mellitus    Atrial fibrillation Son       Social History:     Social History     Socioeconomic History    Marital status:       Spouse name: Not on file    Number of children: Not on file    Years of education: Not on file    Highest education level: Not on file   Occupational History    Occupation: retired   Social Needs    Financial resource strain: Not on file    Food insecurity:     Worry: Not on file     Inability: Not on file   [employer] needs:     Medical: Not on file     Non-medical: Not on file   Tobacco Use    Smoking status: Former Smoker    Tobacco comment: never  smoker ( as per allscripts)   Substance and Sexual Activity    Alcohol use: No    Drug use: No    Sexual activity: Not on file   Lifestyle    Physical activity:     Days per week: Not on file     Minutes per session: Not on file    Stress: Not on file   Relationships    Social connections:     Talks on phone: Not on file     Gets together: Not on file     Attends Sikh service: Not on file     Active member of club or organization: Not on file     Attends meetings of clubs or organizations: Not on file     Relationship status: Not on file    Intimate partner violence:     Fear of current or ex partner: Not on file     Emotionally abused: Not on file     Physically abused: Not on file     Forced sexual activity: Not on file   Other Topics Concern    Not on file   Social History Narrative    Lives with adult children       Medications and Allergies:     Current Outpatient Medications   Medication Sig Dispense Refill    carvedilol (COREG) 12 5 mg tablet Take 1 tablet (12 5 mg total) by mouth 2 (two) times a day with meals 180 tablet 3    Cholecalciferol (VITAMIN D) 2000 units CAPS Take 1 capsule (2,000 Units total) by mouth daily 30 capsule 5    levothyroxine 112 mcg tablet Take 1 tablet (112 mcg total) by mouth daily 90 tablet 3    losartan (COZAAR) 50 mg tablet Take 1 tablet (50 mg total) by mouth daily 90 tablet 3    brompheniramine-pseudoephedrine-DM 30-2-10 MG/5ML syrup Take 5 mL by mouth 4 (four) times a day as needed for cough (Patient not taking: Reported on 6/24/2019) 118 mL 0    fluticasone (FLONASE) 50 mcg/act nasal spray 1 spray into each nostril daily (Patient not taking: Reported on 6/7/2019) 16 g 0    methylPREDNISolone 4 MG tablet therapy pack Use as directed on package (Patient not taking: Reported on 6/24/2019) 21 each 0    mometasone (ASMANEX TWISTHALER) 220 MCG/INH inhaler Inhale 1 puff 2 (two) times a day Rinse mouth after use  (Patient not taking: Reported on 6/7/2019) 1 Inhaler 1    Promethazine-DM (PHENERGAN-DM) 6 25-15 mg/5 mL oral syrup Take 5 mL by mouth 4 (four) times a day as needed for cough (Patient not taking: Reported on 6/24/2019) 240 mL 0     No current facility-administered medications for this visit  No Known Allergies   Immunizations:     Immunization History   Administered Date(s) Administered    INFLUENZA 10/13/2017, 10/25/2018    Influenza Split High Dose Preservative Free IM 11/16/2012, 10/16/2013, 10/27/2014, 11/11/2015, 10/08/2016, 10/13/2017    Influenza TIV (IM) 09/28/2010, 10/14/2011    Influenza, high dose seasonal 0 5 mL 10/25/2018    Pneumococcal Conjugate 13-Valent 06/05/2017    Pneumococcal Polysaccharide PPV23 11/02/1999      Health Maintenance: There are no preventive care reminders to display for this patient        Topic Date Due    INFLUENZA VACCINE  07/01/2019      Medicare Health Risk Assessment:     /90   Pulse 71 Temp 97 7 °F (36 5 °C)   Ht 5' (1 524 m)   Wt 74 1 kg (163 lb 6 4 oz)   SpO2 98%   BMI 31 91 kg/m²      Lauryn Clark is here for her Subsequent Wellness visit  Health Risk Assessment:   Patient rates overall health as good  Patient feels that their physical health rating is same  Eyesight was rated as same  Hearing was rated as same  Patient feels that their emotional and mental health rating is same  Pain experienced in the last 7 days has been none  Patient states that she has experienced no weight loss or gain in last 6 months  Depression Screening:   PHQ-2 Score: 0      Fall Risk Screening: In the past year, patient has experienced: no history of falling in past year      Urinary Incontinence Screening:   Patient has not leaked urine accidently in the last six months  Home Safety:  Patient does not have trouble with stairs inside or outside of their home  Patient has working smoke alarms and has working carbon monoxide detector  Home safety hazards include: none  Nutrition:   Current diet is Regular  Medications:   Patient is not currently taking any over-the-counter supplements  Patient is able to manage medications  Activities of Daily Living (ADLs)/Instrumental Activities of Daily Living (IADLs):   Walk and transfer into and out of bed and chair?: Yes  Dress and groom yourself?: Yes    Bathe or shower yourself?: Yes    Feed yourself? Yes  Do your laundry/housekeeping?: No  Manage your money, pay your bills and track your expenses?: No  Make your own meals?: Yes    Do your own shopping?: Yes    ADL comments: son    Durable Medical Equipment Suppliers  cane    Previous Hospitalizations:   Any hospitalizations or ED visits within the last 12 months?: No      Advance Care Planning:   Living will: Yes    Durable POA for healthcare:  Yes    Advanced directive: Yes      Cognitive Screening:   Provider or family/friend/caregiver concerned regarding cognition?: No    PREVENTIVE SCREENINGS Cardiovascular Screening:    General: Screening Not Indicated and History Lipid Disorder      Diabetes Screening:     General: Screening Current      Colorectal Cancer Screening:     General: Screening Not Indicated      Breast Cancer Screening:     General: Screening Not Indicated      Cervical Cancer Screening:    General: Screening Not Indicated      Osteoporosis Screening:    General: Screening Not Indicated      Abdominal Aortic Aneurysm (AAA) Screening:        General: Screening Not Indicated      Lung Cancer Screening:     General: Screening Not Indicated      Hepatitis C Screening:    General: Screening Not Indicated    Other Counseling Topics:   Car/seat belt/driving safety, skin self-exam and sunscreen         Koki Cheema MD

## 2021-01-15 ENCOUNTER — IN HOME VISIT (OUTPATIENT)
Dept: GERIATRICS | Facility: OTHER | Age: 86
End: 2021-01-15
Payer: COMMERCIAL

## 2021-01-15 DIAGNOSIS — R06.02 SHORTNESS OF BREATH: ICD-10-CM

## 2021-01-15 DIAGNOSIS — S09.90XA CLOSED HEAD INJURY, INITIAL ENCOUNTER: Primary | ICD-10-CM

## 2021-01-15 PROCEDURE — 99335 PR DOM/R-HOME E/M EST PT LW MOD SEVERITY 25 MINUTES: CPT | Performed by: NURSE PRACTITIONER

## 2021-01-17 PROBLEM — R06.02 SHORTNESS OF BREATH: Status: ACTIVE | Noted: 2021-01-15

## 2021-01-17 PROBLEM — S09.90XA CLOSED HEAD INJURY: Status: ACTIVE | Noted: 2021-01-15

## 2021-01-17 NOTE — ASSESSMENT & PLAN NOTE
- Fall in shower today and hit head  - She has a moderate sized hematoma of her left parietal scalp  - Patient not on blood thinners  - No neuro-focal deficits on examination  - Continue neuro checks per facility protocol  - Send to ER for any neuro focal deficits

## 2021-01-17 NOTE — PROGRESS NOTES
[address] [id number] (678) [address]   Acute Visit Note  POS 13        NAME: Austyn MARROQUIN Case  AGE: 80 y o  SEX: female  :  1922  DATE OF ENCOUNTER: 01/15/2021    Chief Complaint   Patient seen and examined status post unwitnessed fall with CHI, Dyspnea    History of Present Illness     Austyn Fairchild is a 80year old female patient of [facility] personal care unit with essential HTN, hypothryroidism, cognitive impairment, macular degeneration, vitamin D deficiency seen and examined today per nursing request for an unwitnessed fall  She suffered a fall in the shower today and states that she did hit her head  She is not on blood thinners  She has a moderate-sized hematoma to her left parietal scalp that is closed, soft and mildly tender upon palpation  Upon examination, patient does remember the fall, but unable to provide a full history of the fall  She denies headache, dizziness, visual disturbance, weakness, arthralgia, myalgia, chest pain, abdominal pain, diarrhea, nausea, vomiting  She does state that she is more short of breath than usual    She has a history of shortness of breath and uses inhalers regularly  She denies fever, cough, sore throat or runny nose  She is tested frequently for COVID-19 at the facility with no positive results reported  The following portions of the patient's history were reviewed and updated as appropriate: allergies, current medications, past family history, past medical history, past social history, past surgical history and problem list     Review of Systems     A review of systems was performed  All negative, except as per HPI      History     Past Medical History:   Diagnosis Date    Cognitive impairment     Hyperlipidemia     Hypertension     Hypothyroidism     Macular degeneration     Vitamin D deficiency      Past Surgical History:   Procedure Laterality Date    APPENDECTOMY       SECTION      COLONOSCOPY N/A 3/31/2019    Procedure: COLONOSCOPY;  Surgeon: Johnathon Ferrari MD;  [facility];  Service: Gastroenterology    ESOPHAGOGASTRODUODENOSCOPY N/A 3/31/2019    Procedure: ESOPHAGOGASTRODUODENOSCOPY (EGD); Surgeon: Johnathon Ferrari MD;  [facility];  Service: Gastroenterology    GALLBLADDER SURGERY       Family History   Problem Relation Age of Onset    Lung cancer Mother     Diabetes Father         mellitus    Atrial fibrillation Son      Social History     Socioeconomic History    Marital status:       Spouse name: Not on file    Number of children: Not on file    Years of education: Not on file    Highest education level: Not on file   Occupational History    Occupation: retired   Social Needs    Financial resource strain: Not on file    Food insecurity     Worry: Not on file     Inability: Not on file   [employer] needs     Medical: Not on file     Non-medical: Not on file   Tobacco Use    Smoking status: Former Smoker    Smokeless tobacco: Never Used    Tobacco comment: never  smoker ( as per allscripts)   Substance and Sexual Activity    Alcohol use: No    Drug use: No    Sexual activity: Not on file   Lifestyle    Physical activity     Days per week: Not on file     Minutes per session: Not on file    Stress: Not on file   Relationships    Social connections     Talks on phone: Not on file     Gets together: Not on file     Attends Sikhism service: Not on file     Active member of club or organization: Not on file     Attends meetings of clubs or organizations: Not on file     Relationship status: Not on file    Intimate partner violence     Fear of current or ex partner: Not on file     Emotionally abused: Not on file     Physically abused: Not on file     Forced sexual activity: Not on file   Other Topics Concern    Not on file   Social History Narrative    Lives with adult children     No Known Allergies    Objective     Vital Signs  BP: 120/71      HR: 76   T: 97 0    RR: 22 O2Sat: 965 R W: 172 lbs  General: NAD, Non-toxic appearing, frail and decondiitoned  Oral: Oropharynx Moist and Clear  Eyes: + Ectropion of bilateral eyelids  Neck: Supple, +ROM  CV: S1, S2, normal rate, regular rhythm, no murmur appreciated  Pulmonary: Lung sounds clear to air, + fine  expiratory posterior wheezing in all lung fields,no  rhonchi, no rales  Abdominal:BS + x4 in all quadrants, soft, no mass, no tenderness  Extremities: No edema, +ROM, +Strength, moderate sized soft cyst like closed hematoma of left parietal scalp   Skin: Warm, Dry, no lesions, no rash, no erythema present, no ecchymosis present  Neurological: CN 2-12 intact, PERRLA  Psych: Baseline orientation, Alert and oriented times person, place, disoriented to time, no mood, no affect    Pertinent Laboratory/Diagnostic Studies:  Reviewed in nursing home medical chart      Current Medications     Current Medications Reviewed and updated in Nursing Home EMR      Assessment and Plan     Closed head injury  - Fall in shower today and hit head  - She has a moderate sized hematoma of her left parietal scalp  - Patient not on blood thinners  - No neuro-focal deficits on examination  - Continue neuro checks per facility protocol  - Send to ER for any neuro focal deficits    Shortness of breath  - Patient tested frequently for COVID-19 on [facility] unit with no positive results reported  - Will order a stat chest x-ray to rule out underlying causes  - CBC with diff and BMP on Monday 01/18/2021      [address] [facility]  01/15/2021

## 2021-01-17 NOTE — ASSESSMENT & PLAN NOTE
- Patient tested frequently for COVID-19 on [facility] unit with no positive results reported  - Will order a stat chest x-ray to rule out underlying causes  - CBC with diff and BMP on Monday 01/18/2021

## 2021-03-18 ENCOUNTER — IN HOME VISIT (OUTPATIENT)
Dept: GERIATRICS | Facility: OTHER | Age: 86
End: 2021-03-18
Payer: COMMERCIAL

## 2021-03-18 DIAGNOSIS — R06.02 SHORTNESS OF BREATH: ICD-10-CM

## 2021-03-18 DIAGNOSIS — R60.0 BILATERAL LOWER EXTREMITY EDEMA: Primary | ICD-10-CM

## 2021-03-18 DIAGNOSIS — R63.5 WEIGHT GAIN: ICD-10-CM

## 2021-03-18 DIAGNOSIS — J41.0 SIMPLE CHRONIC BRONCHITIS (HCC): ICD-10-CM

## 2021-03-18 DIAGNOSIS — I10 BENIGN ESSENTIAL HYPERTENSION: ICD-10-CM

## 2021-03-18 PROCEDURE — 99336 PR DOM/R-HOME E/M EST PT MOD HI SEVERITY 40 MINUTES: CPT | Performed by: NURSE PRACTITIONER

## 2021-03-18 NOTE — PROGRESS NOTES
Assessment/Plan:        Problem List Items Addressed This Visit        Respiratory    Chronic bronchitis ([address] )     - History of chronic wheezing reported by family with no diagnosis of restrictive lung disease  - Will order Duo-Nebs scheduled and prn  - Continue to monitor oxygen saturation, keep sats greater than 92%  Cardiovascular and Mediastinum    Benign essential hypertension     - BP currently stable  - Per records reviewed, in the past she was changed from metoprolol to carvedilol due to hallucinations in 2018 but was placed back on metoprolol, most likely during hospitalization  She continues on Metoprolol 100 mg PO daily with no hallucinations reported  - Continue Losartan 50 mg daily  - Will continue to monitor electrolytes and renal function  - Will monitor vital signs bid for 3 days            Other    Weight gain     - Significant weight gain since 07/2020, 17 pounds  - Patient does not have known history of CHF, however she is positive for Cardiomegaly on chest x-ray  - Will order NT-proBNP in the am  - Furosemide 20 mg PO daily started  - Will consider ordering an echocardiogram if NT-proBNP is elevated  - Monitor daily weights for one week with printout         Shortness of breath     - Patient tested frequently for COVID-19 on [facility] [place] unit with no positive results reported  - Chest x-ray in 09/2020 and 01/2021 show mild cardiomegaly without cardiopulmonary disease  - CBC with diff and CMP in the AM  - Duo-Nebs ordered BID and BID prn   - Continue to monitor oxygen saturation         Bilateral lower extremity edema - Primary      - Increased bilateral lower extremity edema with no pain, tenderness, erythema or fever present on examination   - Will order Furosemide 20 mg PO daily  - BMP, CBC, NT-proBNP in the am                   Subjective: Wheezing, Bilateral lower extremity edema, weight gain     Patient ID: Damien Lewis Case is a 80 y o  female      Austyn Case is a 80year old female patient of the personal care health unit at [facility] with Essential HTN, Hypothyroidism, HLD, Vitamin D Deficiency, Macular Degeneration and Cognitive Impairment seen and examined today per nursing request for increased wheezing, bilateral lower extremity edema and weight gain  Nursing reports that she has gained six pounds in one month and 17 lbs since 07/01/2020  She has a chronic history of wheezing with no diagnosis of restrictive lung disease in the past   She had two chest x-rays performed at the facility on 09/04/2020 and 01/15/2021 that showed mild cardiomegaly with out acute cardiopulmonary abnormality  Upon examination in her apartment, patient is calm, cooperative and in no acute distress  She states that she does not feel herself wheezing but is told by staff that she is wheezing  She denies fever, chills, cough, congestion, malaise  The following portions of the patient's history were reviewed and updated as appropriate: allergies, current medications, past family history, past medical history, past social history, past surgical history and problem list     Review of Systems   Constitutional: Positive for fatigue  Negative for appetite change  HENT: Negative for congestion, sinus pressure and sinus pain  Eyes: Positive for visual disturbance  Respiratory: Positive for wheezing  Negative for cough, chest tightness and shortness of breath  Cardiovascular: Positive for leg swelling  Negative for chest pain and palpitations  Gastrointestinal: Negative for abdominal distention, abdominal pain, constipation, diarrhea and nausea  Musculoskeletal: Negative for arthralgias, back pain, gait problem, joint swelling, myalgias, neck pain and neck stiffness  Skin: Negative for color change, pallor and rash  Neurological: Negative for dizziness, weakness, light-headedness, numbness and headaches  Psychiatric/Behavioral: Negative for agitation, confusion and dysphoric mood   The patient is not nervous/anxious  Objective:      /77   Pulse 62   Temp (!) 96 3 °F (35 7 °C)   Resp 20   Wt 82 6 kg (182 lb)   SpO2 92% Comment: RA  BMI 35 54 kg/m²          Physical Exam  Constitutional:       General: She is not in acute distress  Appearance: Normal appearance  She is not ill-appearing, toxic-appearing or diaphoretic  HENT:      Head: Normocephalic and atraumatic  Nose: Nose normal  No congestion or rhinorrhea  Mouth/Throat:      Mouth: Mucous membranes are moist       Pharynx: Oropharynx is clear  No oropharyngeal exudate or posterior oropharyngeal erythema  Eyes:      General: No scleral icterus  Right eye: No discharge  Left eye: No discharge  Extraocular Movements: Extraocular movements intact  Pupils: Pupils are equal, round, and reactive to light  Neck:      Musculoskeletal: Normal range of motion and neck supple  No neck rigidity or muscular tenderness  Cardiovascular:      Rate and Rhythm: Normal rate and regular rhythm  Heart sounds: Normal heart sounds  No murmur  No friction rub  No gallop  Pulmonary:      Effort: Pulmonary effort is normal  No respiratory distress  Breath sounds: Wheezing present  No rhonchi or rales  Chest:      Chest wall: No tenderness  Abdominal:      General: Bowel sounds are normal  There is no distension  Palpations: Abdomen is soft  Tenderness: There is no abdominal tenderness  There is no guarding or rebound  Musculoskeletal: Normal range of motion  Right lower leg: Edema present  Left lower leg: Edema present  Comments: 4+ RLE edema, 2+ LLE   Lymphadenopathy:      Cervical: No cervical adenopathy  Skin:     General: Skin is warm and dry  Coloration: Skin is not jaundiced or pale  Findings: Erythema present  No bruising or lesion        Comments: Venous stasis of bilateral lower extremities  Cutaneous horn to umbilicus, no tenderness, no erythema, no tenderness upon palpation   Neurological:      General: No focal deficit present  Mental Status: She is alert  Mental status is at baseline  Cranial Nerves: No cranial nerve deficit  Sensory: No sensory deficit  Motor: No weakness  Psychiatric:         Mood and Affect: Mood normal          Behavior: Behavior normal          Thought Content:  Thought content normal

## 2021-03-18 NOTE — PATIENT INSTRUCTIONS
1  Follow-up with Ophthalmologist   2  Furosemide 20 mg PO daily  3  BMP, CBC with diff, NT-pro BNP in the AM  4   Albuterol inhaler 2 puffs BID with a spacer and BID prn for wheezing

## 2021-03-20 VITALS
RESPIRATION RATE: 20 BRPM | WEIGHT: 182 LBS | DIASTOLIC BLOOD PRESSURE: 77 MMHG | SYSTOLIC BLOOD PRESSURE: 132 MMHG | BODY MASS INDEX: 35.54 KG/M2 | OXYGEN SATURATION: 92 % | TEMPERATURE: 96.3 F | HEART RATE: 62 BPM

## 2021-03-20 PROBLEM — J42 CHRONIC BRONCHITIS (HCC): Status: ACTIVE | Noted: 2020-09-04

## 2021-03-20 PROBLEM — R60.0 BILATERAL LOWER EXTREMITY EDEMA: Status: ACTIVE | Noted: 2021-03-18

## 2021-03-20 PROBLEM — R06.2 WHEEZING: Status: ACTIVE | Noted: 2020-09-04

## 2021-03-20 NOTE — ASSESSMENT & PLAN NOTE
- Significant weight gain since 07/2020, 17 pounds  - Patient does not have known history of CHF, however she is positive for Cardiomegaly on chest x-ray  - Will order NT-proBNP in the am  - Furosemide 20 mg PO daily started  - Will consider ordering an echocardiogram if NT-proBNP is elevated  - Monitor daily weights for one week with printout

## 2021-03-20 NOTE — ASSESSMENT & PLAN NOTE
- Patient tested frequently for COVID-19 on [facility] unit with no positive results reported  - Chest x-ray in 09/2020 and 01/2021 show mild cardiomegaly without cardiopulmonary disease  - CBC with diff and CMP in the AM  - Duo-Nebs ordered BID and BID prn   - Continue to monitor oxygen saturation

## 2021-03-20 NOTE — ASSESSMENT & PLAN NOTE
- BP currently stable  - Per records reviewed, in the past she was changed from metoprolol to carvedilol due to hallucinations in 2018 but was placed back on metoprolol, most likely during hospitalization  She continues on Metoprolol 100 mg PO daily with no hallucinations reported      - Continue Losartan 50 mg daily  - Will continue to monitor electrolytes and renal function  - Will monitor vital signs bid for 3 days

## 2021-03-20 NOTE — ASSESSMENT & PLAN NOTE
- History of chronic wheezing reported by family with no diagnosis of restrictive lung disease  - Will order Duo-Nebs scheduled and prn  - Continue to monitor oxygen saturation, keep sats greater than 92%

## 2021-03-20 NOTE — ASSESSMENT & PLAN NOTE
- Increased bilateral lower extremity edema with no pain, tenderness, erythema or fever present on examination   - Will order Furosemide 20 mg PO daily  - BMP, CBC, NT-proBNP in the am

## 2021-04-06 ENCOUNTER — IN HOME VISIT (OUTPATIENT)
Dept: GERIATRICS | Facility: OTHER | Age: 86
End: 2021-04-06
Payer: COMMERCIAL

## 2021-04-06 DIAGNOSIS — J41.0 SIMPLE CHRONIC BRONCHITIS (HCC): ICD-10-CM

## 2021-04-06 DIAGNOSIS — I10 BENIGN ESSENTIAL HYPERTENSION: ICD-10-CM

## 2021-04-06 DIAGNOSIS — R60.0 BILATERAL LOWER EXTREMITY EDEMA: ICD-10-CM

## 2021-04-06 DIAGNOSIS — H61.23 HEARING LOSS DUE TO CERUMEN IMPACTION, BILATERAL: Primary | ICD-10-CM

## 2021-04-06 PROCEDURE — 99336 PR DOM/R-HOME E/M EST PT MOD HI SEVERITY 40 MINUTES: CPT | Performed by: NURSE PRACTITIONER

## 2021-04-06 NOTE — PROGRESS NOTES
[address] [address] [place] [address]  [phone] [facility]   Progress  Note  POS 13           NAME: Austyn MARROQUIN Case  AGE: 80 y o  SEX: female  :  1922  DATE OF ENCOUNTER: 2021      Assessment/Plan:    Hearing loss due to cerumen impaction, bilateral  - Ear lavage scheduled for one week  - Continue supportive care    Chronic bronchitis (HCC)  - Small amount of wheezing noted upon examination, chronic  - Continue Duo-neb treatments bid and prn  - Monitor oxygen saturation, keep saturation greater than or equal to 92% on RA    Bilateral lower extremity edema  - Edema improved with addition of Furosemide 20 mg PO daily and compression stockings  - NT-pro BNP elevated at 671  - Continue Furosemide 20 mg PO daily  - Continue compression stockings and leg elevation  - Will continue to monitor    Benign essential hypertension  - BP currently stable  - Per records reviewed, in the past she was changed from metoprolol to carvedilol due to hallucinations in 2018 but was placed back on metoprolol, most likely during hospitalization  She continues on Metoprolol 100 mg PO daily with no hallucinations reported  - Continue Losartan 50 mg daily  - Will continue to monitor electrolytes and renal function  - Monitor vital signs per facility protocol         Problem List Items Addressed This Visit        Respiratory    Chronic bronchitis ([address] )     - Small amount of wheezing noted upon examination, chronic  - Continue Duo-neb treatments bid and prn  - Monitor oxygen saturation, keep saturation greater than or equal to 92% on RA            Cardiovascular and Mediastinum    Benign essential hypertension     - BP currently stable  - Per records reviewed, in the past she was changed from metoprolol to carvedilol due to hallucinations in 2018 but was placed back on metoprolol, most likely during hospitalization    She continues on Metoprolol 100 mg PO daily with no hallucinations reported  - Continue Losartan 50 mg daily  - Will continue to monitor electrolytes and renal function  - Monitor vital signs per facility protocol            Nervous and Auditory    Hearing loss due to cerumen impaction, bilateral - Primary     - Ear lavage scheduled for one week  - Continue supportive care            Other    Bilateral lower extremity edema     - Edema improved with addition of Furosemide 20 mg PO daily and compression stockings  - NT-pro BNP elevated at 671  - Continue Furosemide 20 mg PO daily  - Continue compression stockings and leg elevation  - Will continue to monitor                 Subjective:      Patient ID: Elizabeth Jauregui Case is a 80 y o  female  Austyn Case is a 80year old female patient of the personal care health unit at [facility] with Essential HTN, Hypothyroidism, HLD, Vitamin D Deficiency, Macular Degeneration and Cognitive Impairment seen and examined today per nursing request for increased wheezing, bilateral lower extremity edema and weight gain  Nursing reports that she has gained six pounds in one month and 17 lbs since 07/01/2020  She has a chronic history of wheezing with no diagnosis of restrictive lung disease in the past   She had two chest x-rays performed at the facility on 09/04/2020 and 01/15/2021 that showed mild cardiomegaly with out acute cardiopulmonary abnormality            The following portions of the patient's history were reviewed and updated as appropriate: allergies, current medications, past family history, past medical history, past social history, past surgical history and problem list     Review of Systems   Constitutional: Negative for appetite change, chills, fatigue and fever  Respiratory: Negative for cough, chest tightness, shortness of breath and wheezing  Cardiovascular: Negative for chest pain, palpitations and leg swelling     Gastrointestinal: Negative for abdominal distention, abdominal pain, constipation, diarrhea, nausea and vomiting  Genitourinary: Negative for difficulty urinating, dysuria, flank pain and frequency  Musculoskeletal: Negative for arthralgias, back pain, gait problem and myalgias  Skin: Negative for color change, pallor, rash and wound  Neurological: Negative for dizziness, weakness, light-headedness and headaches  Psychiatric/Behavioral: Negative for behavioral problems, confusion and sleep disturbance  The patient is not nervous/anxious  Objective:      /70   Pulse 70   Temp 97 6 °F (36 4 °C)   Resp 20   Wt 80 7 kg (178 lb)   SpO2 94% Comment: RA  BMI 34 76 kg/m²          Physical Exam  Constitutional:       General: She is not in acute distress  Appearance: Normal appearance  She is not toxic-appearing or diaphoretic  HENT:      Head: Normocephalic and atraumatic  Right Ear: Ear canal and external ear normal  There is impacted cerumen  Left Ear: Ear canal and external ear normal  There is impacted cerumen  Ears:      Comments: Bilateral cerumen impaction in EAC     Nose: Nose normal  No congestion or rhinorrhea  Mouth/Throat:      Mouth: Mucous membranes are dry  Pharynx: Oropharynx is clear  No posterior oropharyngeal erythema  Eyes:      General: No scleral icterus  Right eye: No discharge  Left eye: No discharge  Extraocular Movements: Extraocular movements intact  Conjunctiva/sclera: Conjunctivae normal       Pupils: Pupils are equal, round, and reactive to light  Comments: Bilateral ectropion   Neck:      Musculoskeletal: Normal range of motion and neck supple  No neck rigidity or muscular tenderness  Cardiovascular:      Rate and Rhythm: Normal rate and regular rhythm  Pulmonary:      Effort: Pulmonary effort is normal  No respiratory distress  Breath sounds: No stridor  Wheezing present  No rhonchi or rales  Comments: Audible wheezing  RLL crackles  Chest:      Chest wall: No tenderness     Abdominal: General: Bowel sounds are normal  There is no distension  Palpations: Abdomen is soft  Tenderness: There is no abdominal tenderness  There is no guarding or rebound  Hernia: No hernia is present  Musculoskeletal:         General: No deformity or signs of injury  Right lower leg: Edema present  Left lower leg: Edema present  Comments: Trace LLE shin and ankle edema  1+ RLE shin edema and trace ankle edema   Skin:     General: Skin is warm and dry  Coloration: Skin is not jaundiced  Findings: No bruising, lesion or rash  Neurological:      Mental Status: She is alert  Mental status is at baseline  Cranial Nerves: No cranial nerve deficit  Sensory: No sensory deficit  Motor: No weakness  Coordination: Coordination normal       Comments: Alert and oriented times self and place, disoriented to time    Psychiatric:         Mood and Affect: Mood normal          Behavior: Behavior normal          Thought Content:  Thought content normal

## 2021-04-07 VITALS
HEART RATE: 70 BPM | TEMPERATURE: 97.6 F | DIASTOLIC BLOOD PRESSURE: 70 MMHG | SYSTOLIC BLOOD PRESSURE: 139 MMHG | WEIGHT: 178 LBS | BODY MASS INDEX: 34.76 KG/M2 | RESPIRATION RATE: 20 BRPM | OXYGEN SATURATION: 94 %

## 2021-04-07 PROBLEM — H61.23 HEARING LOSS DUE TO CERUMEN IMPACTION, BILATERAL: Status: ACTIVE | Noted: 2021-04-06

## 2021-04-08 NOTE — ASSESSMENT & PLAN NOTE
- BP currently stable  - Per records reviewed, in the past she was changed from metoprolol to carvedilol due to hallucinations in 2018 but was placed back on metoprolol, most likely during hospitalization  She continues on Metoprolol 100 mg PO daily with no hallucinations reported      - Continue Losartan 50 mg daily  - Will continue to monitor electrolytes and renal function  - Monitor vital signs per facility protocol

## 2021-04-08 NOTE — ASSESSMENT & PLAN NOTE
- Small amount of wheezing noted upon examination, chronic  - Continue Duo-neb treatments bid and prn  - Monitor oxygen saturation, keep saturation greater than or equal to 92% on RA

## 2021-04-08 NOTE — ASSESSMENT & PLAN NOTE
- Edema improved with addition of Furosemide 20 mg PO daily and compression stockings  - NT-pro BNP elevated at 671  - Continue Furosemide 20 mg PO daily  - Continue compression stockings and leg elevation  - Will continue to monitor

## 2021-04-13 ENCOUNTER — IN HOME VISIT (OUTPATIENT)
Dept: GERIATRICS | Facility: OTHER | Age: 86
End: 2021-04-13
Payer: COMMERCIAL

## 2021-04-13 DIAGNOSIS — H61.23 HEARING LOSS OF BOTH EARS DUE TO CERUMEN IMPACTION: Primary | ICD-10-CM

## 2021-04-14 PROCEDURE — 69210 REMOVE IMPACTED EAR WAX UNI: CPT | Performed by: NURSE PRACTITIONER

## 2021-04-14 NOTE — PROGRESS NOTES
Ear cerumen removal    Date/Time: 4/14/2021 12:23 AM  Performed by: LORNA Ureña  Authorized by: LORNA Ureña   Universal Protocol:  Consent: Verbal consent not obtained  Written consent not obtained  Risks and benefits: risks, benefits and alternatives were discussed  Consent given by: patient  Patient understanding: patient states understanding of the procedure being performed  Patient consent: the patient's understanding of the procedure matches consent given  Procedure consent: procedure consent matches procedure scheduled  Patient identity confirmed: verbally with patient      Patient location:  Other (comment) (Performed in room on [facility] unit)  Indications / Diagnosis:  Hearing loss due to cerumen impaction  Procedure details:     Local anesthetic:  None    Location:  L ear and R ear    Procedure type: irrigation with instrumentation      Instrumentation: curette      Approach:  Internal and natural orifice    Equipment used:  Ear irrigation syringe, curette  Post-procedure details:     Complication:  None    Hearing quality:  Improved    Patient tolerance of procedure:   Tolerated well, no immediate complications      [facility]  POS 13

## 2021-06-23 NOTE — ASSESSMENT & PLAN NOTE
Blood pressure still a little elevated, I recommend increasing carvedilol from 6 25 mg twice a day to 12 5 mg twice a day
Continue carvedilol and will recheck levothyroxine before next appointment
Continue levothyroxine will check thyroid function test before next visit 
Continue to stay active, and continue healthy diet 
Continue vitamin-D
These resolved after switching patient from metoprolol to carvedilol
Strong peripheral pulses

## 2021-07-09 ENCOUNTER — TELEPHONE (OUTPATIENT)
Dept: GERIATRICS | Facility: OTHER | Age: 86
End: 2021-07-09

## 2021-07-16 ENCOUNTER — IN HOME VISIT (OUTPATIENT)
Dept: GERIATRICS | Facility: OTHER | Age: 86
End: 2021-07-16
Payer: COMMERCIAL

## 2021-07-16 VITALS
TEMPERATURE: 98 F | SYSTOLIC BLOOD PRESSURE: 102 MMHG | DIASTOLIC BLOOD PRESSURE: 63 MMHG | RESPIRATION RATE: 18 BRPM | HEART RATE: 74 BPM | OXYGEN SATURATION: 93 %

## 2021-07-16 DIAGNOSIS — E55.9 VITAMIN D DEFICIENCY: ICD-10-CM

## 2021-07-16 DIAGNOSIS — I10 BENIGN ESSENTIAL HYPERTENSION: ICD-10-CM

## 2021-07-16 DIAGNOSIS — J41.0 SIMPLE CHRONIC BRONCHITIS (HCC): Primary | ICD-10-CM

## 2021-07-16 DIAGNOSIS — H35.3190 NONEXUDATIVE AGE-RELATED MACULAR DEGENERATION, UNSPECIFIED LATERALITY, UNSPECIFIED STAGE: ICD-10-CM

## 2021-07-16 DIAGNOSIS — E03.9 ACQUIRED HYPOTHYROIDISM: ICD-10-CM

## 2021-07-16 DIAGNOSIS — R60.0 BILATERAL LOWER EXTREMITY EDEMA: ICD-10-CM

## 2021-07-16 DIAGNOSIS — E78.2 MIXED HYPERLIPIDEMIA: ICD-10-CM

## 2021-07-16 DIAGNOSIS — R06.02 SHORTNESS OF BREATH: ICD-10-CM

## 2021-07-16 PROBLEM — H61.23 HEARING LOSS DUE TO CERUMEN IMPACTION, BILATERAL: Status: RESOLVED | Noted: 2021-04-06 | Resolved: 2021-07-16

## 2021-07-16 PROCEDURE — 99336 PR DOM/R-HOME E/M EST PT MOD HI SEVERITY 40 MINUTES: CPT | Performed by: NURSE PRACTITIONER

## 2021-07-16 RX ORDER — POTASSIUM CHLORIDE 750 MG/1
10 TABLET, EXTENDED RELEASE ORAL DAILY
COMMUNITY

## 2021-07-16 RX ORDER — FUROSEMIDE 20 MG/1
20 TABLET ORAL DAILY
COMMUNITY

## 2021-07-16 RX ORDER — IPRATROPIUM BROMIDE AND ALBUTEROL SULFATE 2.5; .5 MG/3ML; MG/3ML
3 SOLUTION RESPIRATORY (INHALATION) 3 TIMES DAILY
COMMUNITY

## 2021-07-16 NOTE — ASSESSMENT & PLAN NOTE
· Patient states that her vision is getting worse  · Continue Systane eye drops  · Patient's son reports to nursing staff that at her last Ophthalmology appointment one year ago, the doctor reported that the patient's vision would get worse due to her macular degeneration     · Recommend follow-up with Ophthalmology

## 2021-07-16 NOTE — ASSESSMENT & PLAN NOTE
· Currently stable, no wheezing noted upon examination  · Patient states that she is having difficulty using the prn inhaler  · Will discontinue prn inhaler   · Continue Nebulizer treatments prn for sob and wheezing

## 2021-07-16 NOTE — PROGRESS NOTES
Aaron Petersonelle Bautista [address] [facility], [address] [facility]  [phone]  Progress Note  POS 13    ASSESSMENT AND PLAN:  1  Simple chronic bronchitis ([address] )  Assessment & Plan:  · Currently stable, no wheezing noted upon examination  · Patient states that she is having difficulty using the prn inhaler  · Will discontinue prn inhaler   · Continue Nebulizer treatments prn for sob and wheezing      2  Acquired hypothyroidism  Assessment & Plan:  · TSH level stable on 05/24/2021 at 1 14  · Continue Levothyroxine 112 mcg daily  · Will monitor TSH level      3  Benign essential hypertension  Assessment & Plan:  · BP currently stable and controlled  · Continue Metoprolol 100 mg daily and Losartan 50 mg daily  · Will monitor electrolytes and renal function      4  Bilateral lower extremity edema  Assessment & Plan:  · Edema improved with Furosemide 20 mg daily  · NP-pro BNP elevated at 671 on 03/2021  · Continue Furosemide 20 mg PO daily  · Continue compression stockings and leg elevation      5  Nonexudative age-related macular degeneration, unspecified laterality, unspecified stage  Assessment & Plan:  · Patient states that her vision is getting worse  · Continue Systane eye drops  · Patient's son reports to nursing staff that at her last Ophthalmology appointment one year ago, the doctor reported that the patient's vision would get worse due to her macular degeneration  · Recommend follow-up with Ophthalmology      6  Mixed hyperlipidemia  Assessment & Plan:  · Lipid panel reviewed from 05/2021  Cholesterol level 227, Triglycerides 167, LDL level 140  · Will not start statin at this time due to risk versus benefit ratio in the elderly  · Continue lifestyle management with diet and exercise      7  Shortness of breath  Assessment & Plan:  · Chronic sob reported that is currently stable  · Continue prn nebulizer treatments  · Continue to monitor SPO2 levels      8   Vitamin D deficiency  Assessment & Plan:  · Continue vitamin D supplementation          HPI:      Austyn Fairchild is a 80year old female patient of [facility] personal care unit with essential HTN, hypothyroidism, cognitive impairment, macular degeneration, vitamin D deficiency seen and examined today to follow-up on acute and chronic medical conditions and medication review  Upon examination, patient is sitting comfortably in her chair, she is awake, alert and in no acute distress  She states that she has a good appetite, she is sleeping well at night and her mood is stable  She does state that she feels confused at times but states that she knows where she is at and has assistance when she goes off the unit and with bathing  She ambulates independently with her roller walker  She states that occasionally she experiences wheezing  She states that she is unable to use a inhaler because she does not know how to use it  She has a nebulizer machine that she states works better for her  She denies pain, fever, chills  She states that her vision is not what it use to be  She feels like it is getting worse  She states that she thinks that she has an eye doctor that she follows up with  ROS: Review of Systems   Constitutional: Positive for fatigue  Negative for appetite change, chills and fever  HENT: Negative for congestion, hearing loss, rhinorrhea, sinus pressure and sinus pain  Eyes: Positive for visual disturbance  Negative for photophobia, pain, discharge and redness  Respiratory: Positive for shortness of breath and wheezing  Negative for cough and chest tightness  Occasional sob worse with exertion  Wheezing at times   Cardiovascular: Negative for chest pain, palpitations and leg swelling  Gastrointestinal: Negative for abdominal pain, constipation, diarrhea and vomiting  Genitourinary: Negative for dysuria, frequency, hematuria and urgency     Musculoskeletal: Negative for arthralgias, back pain, gait problem, myalgias, neck pain and neck stiffness  Roller walker for ambulation   Skin: Negative for color change, pallor, rash and wound  Neurological: Negative for dizziness, tremors, weakness, numbness and headaches  Psychiatric/Behavioral: Positive for confusion  Negative for dysphoric mood and sleep disturbance  The patient is not nervous/anxious  No Known Allergies    Medications:    Current Outpatient Medications on File Prior to Visit   Medication Sig Dispense Refill    acetaminophen (TYLENOL) 325 mg tablet Take by mouth every 6 (six) hours as needed for mild pain or fever AND GENERAL DISCOMFORT   albuterol (PROVENTIL HFA,VENTOLIN HFA) 90 mcg/act inhaler       Cholecalciferol (VITAMIN D) 2000 units CAPS Take 1 capsule (2,000 Units total) by mouth daily 30 capsule 5    fluticasone (FLONASE) 50 mcg/act nasal spray 1 spray into each nostril daily (Patient not taking: Reported on 6/7/2019) 16 g 0    irbesartan (AVAPRO) 75 mg tablet Take 1 tablet (75 mg total) by mouth daily at bedtime (Patient not taking: Reported on 8/19/2020) 30 tablet 5    levothyroxine 112 mcg tablet Take 1 tablet (112 mcg total) by mouth daily 90 tablet 3    losartan (COZAAR) 50 mg tablet TAKE 1 TABLET BY MOUTH EVERY DAY 90 tablet 3    magnesium hydroxide (MILK OF MAGNESIA) 400 mg/5 mL oral suspension Take 30 mL by mouth daily as needed for constipation      metoprolol succinate (TOPROL-XL) 100 mg 24 hr tablet Take 1 tablet (100 mg total) by mouth daily 30 tablet 5    mometasone (ASMANEX TWISTHALER) 220 MCG/INH inhaler Inhale 1 puff 2 (two) times a day Rinse mouth after use  (Patient not taking: Reported on 6/7/2019) 1 Inhaler 1    polyethylene glycol-propylene glycol (SYSTANE) 0 4-0 3 % Administer 1 drop to both eyes 3 (three) times a day FOR DRY EYES       No current facility-administered medications on file prior to visit         History:  Past Medical History:   Diagnosis Date    Cognitive impairment     Hearing loss due to cerumen impaction, bilateral 2021    Hyperlipidemia     Hypertension     Hypothyroidism     Macular degeneration     Vitamin D deficiency      Past Surgical History:   Procedure Laterality Date    APPENDECTOMY       SECTION      COLONOSCOPY N/A 3/31/2019    Procedure: COLONOSCOPY;  Surgeon: Arianna Roque MD;  [facility];  Service: Gastroenterology    ESOPHAGOGASTRODUODENOSCOPY N/A 3/31/2019    Procedure: ESOPHAGOGASTRODUODENOSCOPY (EGD); Surgeon: Arianna Roque MD;  [facility];  Service: Gastroenterology    GALLBLADDER SURGERY       Family History   Problem Relation Age of Onset    Lung cancer Mother     Diabetes Father         mellitus    Atrial fibrillation Son      Social History     Socioeconomic History    Marital status:      Spouse name: Not on file    Number of children: Not on file    Years of education: Not on file    Highest education level: Not on file   Occupational History    Occupation: retired   Tobacco Use    Smoking status: Former Smoker    Smokeless tobacco: Never Used    Tobacco comment: never  smoker ( as per allscripts)   Substance and Sexual Activity    Alcohol use: No    Drug use: No    Sexual activity: Not on file   Other Topics Concern    Not on file   Social History Narrative    Lives with adult children     Social Determinants of Health     Financial Resource Strain:     Difficulty of Paying Living Expenses:    Food Insecurity:     Worried About [address] [place] in the Last Year:     [address] [facility] in the Last Year:    Transportation Needs:     Lack of Transportation (Medical):      Lack of Transportation (Non-Medical):    Physical Activity:     Days of Exercise per Week:     Minutes of Exercise per Session:    Stress:     Feeling of Stress :    Social Connections:     Frequency of Communication with Friends and Family:     Frequency of Social Gatherings with Friends and Family:     Attends Restoration Services:     Active Member of Clubs or Organizations:     Attends Club or Organization Meetings:     Marital Status:    Intimate Partner Violence:     Fear of Current or Ex-Partner:     Emotionally Abused:     Physically Abused:     Sexually Abused:      Past Surgical History:   Procedure Laterality Date    APPENDECTOMY       SECTION      COLONOSCOPY N/A 3/31/2019    Procedure: COLONOSCOPY;  Surgeon: Madalyn Elias MD;  [facility];  Service: Gastroenterology    ESOPHAGOGASTRODUODENOSCOPY N/A 3/31/2019    Procedure: ESOPHAGOGASTRODUODENOSCOPY (EGD); Surgeon: Madalyn Elias MD;  [facility];  Service: Gastroenterology    GALLBLADDER SURGERY         OBJECTIVE:  Vitals:    21 1000   BP: 102/63   Pulse: 74   Resp: 18   Temp: 98 °F (36 7 °C)   SpO2: 93%     Weight: 180 lbs on 2021  Physical Exam  Constitutional:       General: She is not in acute distress  Appearance: Normal appearance  She is not ill-appearing, toxic-appearing or diaphoretic  HENT:      Head: Normocephalic and atraumatic  Right Ear: External ear normal       Left Ear: External ear normal       Nose: Nose normal  No congestion  Mouth/Throat:      Mouth: Mucous membranes are moist       Pharynx: Oropharynx is clear  No oropharyngeal exudate or posterior oropharyngeal erythema  Eyes:      General: No scleral icterus  Right eye: No discharge  Left eye: No discharge  Extraocular Movements: Extraocular movements intact  Conjunctiva/sclera: Conjunctivae normal       Pupils: Pupils are equal, round, and reactive to light  Comments: Positive ectropion of bilateral eyes   Cardiovascular:      Rate and Rhythm: Normal rate and regular rhythm  Pulses: Normal pulses  Heart sounds: Normal heart sounds  Pulmonary:      Effort: Pulmonary effort is normal  No respiratory distress  Breath sounds: Normal breath sounds  No wheezing, rhonchi or rales  Chest:      Chest wall: No tenderness  Abdominal:      General: Bowel sounds are normal  There is no distension  Palpations: Abdomen is soft  There is no mass  Tenderness: There is no abdominal tenderness  There is no guarding  Musculoskeletal:         General: No swelling, tenderness or deformity  Normal range of motion  Cervical back: Normal range of motion and neck supple  No rigidity  Right lower leg: Edema present  Left lower leg: No edema  Comments: Trace RLE edema   Skin:     General: Skin is warm and dry  Coloration: Skin is not jaundiced  Findings: No bruising, erythema or lesion  Neurological:      General: No focal deficit present  Mental Status: She is alert  Cranial Nerves: No cranial nerve deficit  Motor: No weakness  Comments: Alert and oriented to person and place, disoriented to time   Psychiatric:         Mood and Affect: Mood normal          Behavior: Behavior normal          Thought Content: Thought content normal          Labs & Imaging:  Lab Results   Component Value Date    WBC 6 36 05/12/2020    HGB 12 0 05/12/2020    HCT 40 4 05/12/2020    MCV 91 05/12/2020     05/12/2020     Lab Results   Component Value Date    SODIUM 138 05/12/2020    K 3 9 05/12/2020     05/12/2020    CO2 27 05/12/2020    BUN 20 05/12/2020    CREATININE 0 85 05/12/2020    GLUC 103 04/01/2019    CALCIUM 9 4 05/12/2020     Lab Results   Component Value Date    LFTGAWSE42 213 05/12/2020     Lab Results   Component Value Date    [id number] 1 030 05/12/2020     Lab Results   Component Value Date    LGOS19AFHOAN 37 2 05/12/2020         06/15/2021: Glucose 78, BUN 33, Creatinine 0 91, Sodium 142, Potassium 4 4, Chloride 111  06/15/2021: Hemoglobin 12 6, HCT: 37 9, WBC 6 6, Platelet Count 787  No results found for this or any previous visit

## 2021-07-16 NOTE — ASSESSMENT & PLAN NOTE
· TSH level stable on 05/24/2021 at 1 14  · Continue Levothyroxine 112 mcg daily  · Will monitor TSH level

## 2021-07-16 NOTE — ASSESSMENT & PLAN NOTE
· Edema improved with Furosemide 20 mg daily  · NP-pro BNP elevated at 671 on 03/2021  · Continue Furosemide 20 mg PO daily  · Continue compression stockings and leg elevation

## 2021-07-16 NOTE — ASSESSMENT & PLAN NOTE
· Chronic sob reported that is currently stable  · Continue prn nebulizer treatments  · Continue to monitor SPO2 levels

## 2021-07-16 NOTE — ASSESSMENT & PLAN NOTE
· BP currently stable and controlled  · Continue Metoprolol 100 mg daily and Losartan 50 mg daily  · Will monitor electrolytes and renal function

## 2021-07-16 NOTE — ASSESSMENT & PLAN NOTE
· Lipid panel reviewed from 05/2021   Cholesterol level 227, Triglycerides 167, LDL level 140  · Will not start statin at this time due to risk versus benefit ratio in the elderly  · Continue lifestyle management with diet and exercise

## 2021-09-24 ENCOUNTER — OFFICE VISIT (OUTPATIENT)
Dept: GERIATRICS | Facility: OTHER | Age: 86
End: 2021-09-24
Payer: COMMERCIAL

## 2021-09-24 VITALS
RESPIRATION RATE: 16 BRPM | SYSTOLIC BLOOD PRESSURE: 110 MMHG | OXYGEN SATURATION: 96 % | TEMPERATURE: 97.3 F | DIASTOLIC BLOOD PRESSURE: 60 MMHG | HEART RATE: 62 BPM

## 2021-09-24 DIAGNOSIS — R41.89 COGNITIVE IMPAIRMENT: ICD-10-CM

## 2021-09-24 DIAGNOSIS — I10 BENIGN ESSENTIAL HYPERTENSION: ICD-10-CM

## 2021-09-24 DIAGNOSIS — R06.02 SHORTNESS OF BREATH: ICD-10-CM

## 2021-09-24 DIAGNOSIS — R60.0 BILATERAL LOWER EXTREMITY EDEMA: ICD-10-CM

## 2021-09-24 DIAGNOSIS — J41.0 SIMPLE CHRONIC BRONCHITIS (HCC): ICD-10-CM

## 2021-09-24 DIAGNOSIS — H61.20 CERUMEN IN AUDITORY CANAL ON EXAMINATION: Primary | ICD-10-CM

## 2021-09-24 PROCEDURE — 99214 OFFICE O/P EST MOD 30 MIN: CPT | Performed by: NURSE PRACTITIONER

## 2021-09-24 PROCEDURE — 69210 REMOVE IMPACTED EAR WAX UNI: CPT | Performed by: NURSE PRACTITIONER

## 2021-09-24 RX ORDER — NYSTATIN 100000 U/G
CREAM TOPICAL
COMMUNITY
Start: 2021-09-06

## 2021-09-24 NOTE — PROGRESS NOTES
ASSESSMENT AND PLAN:  1  Cerumen in auditory canal on examination  -     Ear cerumen removal    2  Shortness of breath  Assessment & Plan:  · Patient is a history of chronic shortness of breath however nursing reports that the shortness of breath is worsening  · Will order a chest x-ray, two view, will order blood work, CBC with diff and CMP  · Continue to monitor vital signs and SpO2 levels   · Continue nebulizer treatments      3  Cognitive impairment  Assessment & Plan:  ·  Increased confusion reported by staff  · Will consult speech therapy to perform cognitive screening   · Continue current level of care in personal care at [facility]   · Encourage participation in social, cognitive, physical activities as tolerated  · Will manage chronic conditions  · Continue supportive care      4  Benign essential hypertension  Assessment & Plan:  · BP currently stable and controlled  · Continue Metoprolol 100 mg daily and Losartan 50 mg daily  · Will monitor CMP      5  Bilateral lower extremity edema  Assessment & Plan:  ·  Edema currently at baseline  · Continue furosemide 20 mg daily  · Continue compression stockings and leg elevation      6  Simple chronic bronchitis (HCC)  Assessment & Plan:  · History of chronic bronchitis  · Will order a chest x-ray to monitor for infection  · CBC with diff and CMP ordered  · Continue Nebulizer treatments            HPI:    Austyn Fairchild is a 80year old female patient of [facility] personal care unit with essential HTN, hypothryroidism, cognitive impairment, macular degeneration, vitamin D deficiency seen and examined today per nursing request for increased dyspnea and confusion today  She has a history of chronic sob and bronchitis and uses nebulizer treatments regularly  Upon examination today, patient is calm, cooperative and in no acute distress  She currently denies dyspnea, states that she feels fine today       ROS: Review of Systems   Constitutional: Negative for appetite change, chills, fatigue and fever  HENT: Negative for congestion and rhinorrhea  Eyes: Positive for redness  Negative for pain  Chronic eye condition   Respiratory: Negative for cough, chest tightness and shortness of breath  Coarse lungs (chronic)   Cardiovascular: Positive for leg swelling  Negative for chest pain         +1 b/l LE edema   Gastrointestinal: Negative for constipation, diarrhea, nausea and vomiting  Genitourinary: Negative for dysuria, flank pain, pelvic pain and urgency  Musculoskeletal: Positive for gait problem  Negative for arthralgias, myalgias, neck pain and neck stiffness  Uses wheelchair   Skin: Negative for pallor and rash  Neurological: Negative for dizziness, light-headedness, numbness and headaches  Psychiatric/Behavioral: Negative for confusion, dysphoric mood, sleep disturbance and suicidal ideas  No Known Allergies    Medications:    Current Outpatient Medications on File Prior to Visit   Medication Sig Dispense Refill    acetaminophen (TYLENOL) 325 mg tablet Take by mouth every 6 (six) hours as needed for mild pain or fever AND GENERAL DISCOMFORT        Cholecalciferol (VITAMIN D) 2000 units CAPS Take 1 capsule (2,000 Units total) by mouth daily 30 capsule 5    fluticasone (FLONASE) 50 mcg/act nasal spray 1 spray into each nostril daily (Patient not taking: Reported on 6/7/2019) 16 g 0    furosemide (LASIX) 20 mg tablet Take 20 mg by mouth daily      ipratropium-albuterol (DUO-NEB) 0 5-2 5 mg/3 mL nebulizer solution Take 3 mL by nebulization every 4 (four) hours as needed for wheezing or shortness of breath      irbesartan (AVAPRO) 75 mg tablet Take 1 tablet (75 mg total) by mouth daily at bedtime (Patient not taking: Reported on 8/19/2020) 30 tablet 5    levothyroxine 112 mcg tablet Take 1 tablet (112 mcg total) by mouth daily 90 tablet 3    losartan (COZAAR) 50 mg tablet TAKE 1 TABLET BY MOUTH EVERY DAY 90 tablet 3    magnesium hydroxide (MILK OF MAGNESIA) 400 mg/5 mL oral suspension Take 30 mL by mouth daily as needed for constipation      metoprolol succinate (TOPROL-XL) 100 mg 24 hr tablet Take 1 tablet (100 mg total) by mouth daily 30 tablet 5    nystatin (MYCOSTATIN) cream       polyethylene glycol-propylene glycol (SYSTANE) 0 4-0 3 % Administer 1 drop to both eyes 3 (three) times a day FOR DRY EYES      potassium chloride (Klor-Con M10) 10 mEq tablet Take 10 mEq by mouth daily       No current facility-administered medications on file prior to visit  History:  Past Medical History:   Diagnosis Date    Cognitive impairment     Hearing loss due to cerumen impaction, bilateral 2021    Hyperlipidemia     Hypertension     Hypothyroidism     Macular degeneration     Vitamin D deficiency      Past Surgical History:   Procedure Laterality Date    APPENDECTOMY       SECTION      COLONOSCOPY N/A 3/31/2019    Procedure: COLONOSCOPY;  Surgeon: Priscilla Zurita MD;  [facility];  Service: Gastroenterology    ESOPHAGOGASTRODUODENOSCOPY N/A 3/31/2019    Procedure: ESOPHAGOGASTRODUODENOSCOPY (EGD); Surgeon: Priscilla Zurita MD;  [facility];  Service: Gastroenterology    GALLBLADDER SURGERY       Family History   Problem Relation Age of Onset    Lung cancer Mother     Diabetes Father         mellitus    Atrial fibrillation Son      Social History     Socioeconomic History    Marital status:       Spouse name: Not on file    Number of children: Not on file    Years of education: Not on file    Highest education level: Not on file   Occupational History    Occupation: retired   Tobacco Use    Smoking status: Former Smoker    Smokeless tobacco: Never Used    Tobacco comment: never  smoker ( as per allscripts)   Substance and Sexual Activity    Alcohol use: No    Drug use: No    Sexual activity: Not on file   Other Topics Concern    Not on file   Social History Narrative    Lives with adult children     Social Determinants of Health     Financial Resource Strain:     Difficulty of Paying Living Expenses:    Food Insecurity:     Worried About Running Out of Food in the Last Year:     [address] in the Last Year:    Transportation Needs:     Lack of Transportation (Medical):  Lack of Transportation (Non-Medical):    Physical Activity:     Days of Exercise per Week:     Minutes of Exercise per Session:    Stress:     Feeling of Stress :    Social Connections:     Frequency of Communication with Friends and Family:     Frequency of Social Gatherings with Friends and Family:     Attends Restoration Services:     Active Member of Clubs or Organizations:     Attends Club or Organization Meetings:     Marital Status:    Intimate Partner Violence:     Fear of Current or Ex-Partner:     Emotionally Abused:     Physically Abused:     Sexually Abused:      Past Surgical History:   Procedure Laterality Date    APPENDECTOMY       SECTION      COLONOSCOPY N/A 3/31/2019    Procedure: COLONOSCOPY;  Surgeon: Zahra Pimentel MD;  [facility];  Service: Gastroenterology    ESOPHAGOGASTRODUODENOSCOPY N/A 3/31/2019    Procedure: ESOPHAGOGASTRODUODENOSCOPY (EGD); Surgeon: Zahra Pimentel MD;  [facility];  Service: Gastroenterology    GALLBLADDER SURGERY         OBJECTIVE:  Vitals:    21 1057   BP: 110/60   BP Location: Left arm   Patient Position: Sitting   Cuff Size: Standard   Pulse: 62   Resp: 16   Temp: (!) 97 3 °F (36 3 °C)   TempSrc: Temporal   SpO2: 96%     There is no height or weight on file to calculate BMI  Physical Exam  Constitutional:       General: She is not in acute distress  Appearance: Normal appearance  She is well-developed  She is not ill-appearing, toxic-appearing or diaphoretic  HENT:      Head: Normocephalic and atraumatic        Right Ear: Tympanic membrane and ear canal normal       Left Ear: Tympanic membrane and ear canal normal  Ears:      Comments: Cerumen debris of bilateral EACs     Nose: No congestion or rhinorrhea  Mouth/Throat:      Mouth: Mucous membranes are moist       Pharynx: Oropharynx is clear  No pharyngeal swelling or oropharyngeal exudate  Eyes:      General: No scleral icterus  Right eye: No discharge  Left eye: No discharge  Extraocular Movements: Extraocular movements intact  Pupils: Pupils are equal, round, and reactive to light  Comments: Bilateral lower lid ectropion   Cardiovascular:      Rate and Rhythm: Normal rate and regular rhythm  Pulses: Normal pulses  Heart sounds: Normal heart sounds  Pulmonary:      Effort: No respiratory distress  Breath sounds: Examination of the right-lower field reveals decreased breath sounds and rhonchi  Examination of the left-lower field reveals decreased breath sounds and rhonchi  Decreased breath sounds, wheezing and rhonchi present  Comments: Fine audible wheezing present  Chest:      Chest wall: No mass, deformity, tenderness or crepitus  Abdominal:      General: Bowel sounds are normal  There is no distension  Palpations: Abdomen is soft  There is no mass  Tenderness: There is no abdominal tenderness  There is no guarding  Musculoskeletal:         General: No tenderness or signs of injury  Normal range of motion  Cervical back: Normal range of motion and neck supple  Right lower leg: No tenderness  Edema present  Left lower leg: No tenderness  Edema present  Lymphadenopathy:      Cervical: No cervical adenopathy  Skin:     General: Skin is warm and dry  Coloration: Skin is not cyanotic or pale  Findings: No erythema or rash  Neurological:      General: No focal deficit present  Mental Status: She is alert  Cranial Nerves: No cranial nerve deficit  Motor: No weakness  Psychiatric:         Mood and Affect: Mood normal  Mood is not anxious           Behavior: Behavior normal  Behavior is not agitated  Thought Content: Thought content normal          Labs & Imaging:  Lab Results   Component Value Date    WBC 6 36 05/12/2020    HGB 12 0 05/12/2020    HCT 40 4 05/12/2020    MCV 91 05/12/2020     05/12/2020     Lab Results   Component Value Date    SODIUM 138 05/12/2020    K 3 9 05/12/2020     05/12/2020    CO2 27 05/12/2020    BUN 20 05/12/2020    CREATININE 0 85 05/12/2020    GLUC 103 04/01/2019    CALCIUM 9 4 05/12/2020     Lab Results   Component Value Date    RQPOSFIH29 120 05/12/2020     Lab Results   Component Value Date    [id number] 1 030 05/12/2020     Lab Results   Component Value Date    [id number] 37 2 05/12/2020      No results found for this or any previous visit

## 2021-09-24 NOTE — PATIENT INSTRUCTIONS
1  Chest x-ray, 2 view stat Re: dyspnea  2  CBC with diff and CMP on Monday 09/27/2021  3  ST consult for MOCA test  4   Follow-up in one week on the unit

## 2021-09-24 NOTE — PROGRESS NOTES
Ear cerumen removal    Date/Time: 9/24/2021 11:08 AM  Performed by: LORNA Dietz  Authorized by: LORNA Dietz   Universal Protocol:  Consent: Verbal consent obtained  Consent given by: patient  Timeout called at: 9/24/2021 11:08 AM   Patient understanding: patient states understanding of the procedure being performed  Patient consent: the patient's understanding of the procedure matches consent given  Patient identity confirmed: verbally with patient      Patient location:  Bedside  Procedure details:     Local anesthetic:  None    Location:  L ear and R ear    Procedure type: curette      Approach:  Internal and natural orifice  Post-procedure details:     Complication:  None    Hearing quality:  Improved    Patient tolerance of procedure:   Tolerated well, no immediate complications

## 2021-09-27 NOTE — ASSESSMENT & PLAN NOTE
·  Increased confusion reported by staff  · Will consult speech therapy to perform cognitive screening   · Continue current level of care in personal care at [address] [place] [address]   · Encourage participation in social, cognitive, physical activities as tolerated  · Will manage chronic conditions  · Continue supportive care

## 2021-09-27 NOTE — ASSESSMENT & PLAN NOTE
· Patient is a history of chronic shortness of breath however nursing reports that the shortness of breath is worsening  · Will order a chest x-ray, two view, will order blood work, CBC with diff and CMP  · Continue to monitor vital signs and SpO2 levels   · Continue nebulizer treatments

## 2021-09-27 NOTE — ASSESSMENT & PLAN NOTE
· BP currently stable and controlled  · Continue Metoprolol 100 mg daily and Losartan 50 mg daily  · Will monitor CMP

## 2021-09-27 NOTE — ASSESSMENT & PLAN NOTE
· History of chronic bronchitis  · Will order a chest x-ray to monitor for infection  · CBC with diff and CMP ordered  · Continue Nebulizer treatments

## 2021-09-27 NOTE — ASSESSMENT & PLAN NOTE
·  Edema currently at baseline  · Continue furosemide 20 mg daily  · Continue compression stockings and leg elevation

## 2021-10-01 ENCOUNTER — IN HOME VISIT (OUTPATIENT)
Dept: GERIATRICS | Facility: OTHER | Age: 86
End: 2021-10-01
Payer: COMMERCIAL

## 2021-10-01 DIAGNOSIS — I10 BENIGN ESSENTIAL HYPERTENSION: ICD-10-CM

## 2021-10-01 DIAGNOSIS — R91.1 NODULE OF UPPER LOBE OF RIGHT LUNG: Primary | ICD-10-CM

## 2021-10-01 DIAGNOSIS — R60.0 BILATERAL LOWER EXTREMITY EDEMA: ICD-10-CM

## 2021-10-01 DIAGNOSIS — R41.89 COGNITIVE IMPAIRMENT: ICD-10-CM

## 2021-10-01 PROCEDURE — 99336 PR DOM/R-HOME E/M EST PT MOD HI SEVERITY 40 MINUTES: CPT | Performed by: NURSE PRACTITIONER

## 2021-10-02 PROBLEM — R91.1 NODULE OF UPPER LOBE OF RIGHT LUNG: Status: ACTIVE | Noted: 2021-10-01

## 2021-11-09 ENCOUNTER — OFFICE VISIT (OUTPATIENT)
Dept: GERIATRICS | Facility: OTHER | Age: 86
End: 2021-11-09
Payer: COMMERCIAL

## 2021-11-09 DIAGNOSIS — R91.1 NODULE OF UPPER LOBE OF RIGHT LUNG: ICD-10-CM

## 2021-11-09 DIAGNOSIS — R06.2 WHEEZING: Primary | ICD-10-CM

## 2021-11-09 DIAGNOSIS — R06.02 SHORTNESS OF BREATH: ICD-10-CM

## 2021-11-09 DIAGNOSIS — R05.9 COUGH: ICD-10-CM

## 2021-11-09 PROCEDURE — 99214 OFFICE O/P EST MOD 30 MIN: CPT | Performed by: NURSE PRACTITIONER

## 2021-11-09 PROCEDURE — 1160F RVW MEDS BY RX/DR IN RCRD: CPT | Performed by: NURSE PRACTITIONER

## 2021-11-10 PROBLEM — R06.2 WHEEZING: Status: ACTIVE | Noted: 2021-11-09

## 2022-03-08 ENCOUNTER — IN HOME VISIT (OUTPATIENT)
Dept: GERIATRICS | Facility: OTHER | Age: 87
End: 2022-03-08
Payer: COMMERCIAL

## 2022-03-08 DIAGNOSIS — R60.0 BILATERAL LOWER EXTREMITY EDEMA: ICD-10-CM

## 2022-03-08 DIAGNOSIS — H35.3190 NONEXUDATIVE AGE-RELATED MACULAR DEGENERATION, UNSPECIFIED LATERALITY, UNSPECIFIED STAGE: ICD-10-CM

## 2022-03-08 DIAGNOSIS — R91.1 NODULE OF UPPER LOBE OF RIGHT LUNG: ICD-10-CM

## 2022-03-08 DIAGNOSIS — I10 BENIGN ESSENTIAL HYPERTENSION: ICD-10-CM

## 2022-03-08 DIAGNOSIS — R26.2 AMBULATORY DYSFUNCTION: ICD-10-CM

## 2022-03-08 DIAGNOSIS — R41.89 COGNITIVE IMPAIRMENT: Primary | ICD-10-CM

## 2022-03-08 PROCEDURE — 99334 PR DOM/R-HOME E/M EST PT SELF-LMTD/MINOR 15 MINUTES: CPT | Performed by: NURSE PRACTITIONER

## 2022-03-08 NOTE — PROGRESS NOTES
[address]  [phone]  [address]  In Home Assessment 13         Assessment/Plan:    Cognitive impairment  · Loss [place] test score 9/30  · Patient seems to be at baseline today  · Continue current level of care in [facility]  · Continue to encourage activity and engagement  · Continue supportive measures    Bilateral lower extremity edema  · Chronic lower extremity edema  · Continue Lasix 20 mg daily  · Continue to encourage elevation and apply compression stockings daily    Benign essential hypertension  · BP today stable  · Continue BP meds  · Avoid hypotension  · Will continue to monitor BMP      Macular degeneration  · Continue all drops  · Patient states no further problems with vision  · Follow-up with ophthalmology as needed    Nodule of upper lobe of right lung  · No further interventions at this time  · Continue nebulizer treatments  · Patient denies any shortness of breath  · Lungs clear today  · Continue to monitor pulmonary status    Ambulatory dysfunction   Maintain fall and safety precautions   Encourage use of call bell   Continue to encourage use of assistive device   Assist with transfers, mobility, and ADLs           Problem List Items Addressed This Visit        Cardiovascular and Mediastinum    Benign essential hypertension     · BP today stable  · Continue BP meds  · Avoid hypotension  · Will continue to monitor BMP              Other    Ambulatory dysfunction      Maintain fall and safety precautions   Encourage use of call bell   Continue to encourage use of assistive device   Assist with transfers, mobility, and ADLs           Bilateral lower extremity edema     · Chronic lower extremity edema  · Continue Lasix 20 mg daily  · Continue to encourage elevation and apply compression stockings daily         Cognitive impairment - Primary     · Loss [place] test score 9/30  · Patient seems to be at baseline today  · Continue current level of care in [facility]  · Continue to encourage activity and engagement  · Continue supportive measures         Macular degeneration     · Continue all drops  · Patient states no further problems with vision  · Follow-up with ophthalmology as needed         Nodule of upper lobe of right lung     · No further interventions at this time  · Continue nebulizer treatments  · Patient denies any shortness of breath  · Lungs clear today  · Continue to monitor pulmonary status                 Subjective:      Patient ID: Hernesto Pimentel Case is a 80 y o  female  Austyn Case is a 80 y o female patient of [facility] being seen today for her 4 month follow up visit  Nursing reports the patient had 2 falls over the past week  The patient does not recall what happened when asked about the incidents  Nursing reports that the patient had an egg crate mattress topper that did not fit over the bed properly and feels that this contributed to the patient "slipping," out of her bed x 2  Nursing staff asked the family to remove this or replace with a proper fitting topper and the family removed  The patient has since not had any falls  The patient reports "I think I have been doing alright "    Review of Systems   Constitutional: Negative  HENT: Negative  Eyes: Positive for discharge, redness and visual disturbance  Respiratory: Positive for cough  Cardiovascular: Negative  Gastrointestinal: Negative  Endocrine: Negative  Genitourinary: Negative  Musculoskeletal: Positive for gait problem  Skin: Negative  Neurological: Positive for weakness  Hematological: Negative  Psychiatric/Behavioral: Negative  Objective: There were no vitals taken for this visit  Physical Exam  Vitals reviewed  Constitutional:       Appearance: Normal appearance  HENT:      Head: Normocephalic  Nose: Nose normal       Mouth/Throat:      Mouth: Mucous membranes are dry  Pharynx: Oropharynx is clear  Eyes:      General:         Right eye: Discharge present  Left eye: Discharge present  Cardiovascular:      Rate and Rhythm: Normal rate and regular rhythm  Heart sounds: Normal heart sounds  Pulmonary:      Effort: Pulmonary effort is normal       Breath sounds: Normal breath sounds  Abdominal:      General: Bowel sounds are normal       Palpations: Abdomen is soft  Musculoskeletal:      Cervical back: Normal range of motion and neck supple  Right lower leg: Edema present  Skin:     General: Skin is warm and dry  Neurological:      Mental Status: She is alert  Mental status is at baseline  Psychiatric:         Mood and Affect: Mood normal          Behavior: Behavior normal          Thought Content:  Thought content normal          Judgment: Judgment normal

## 2022-03-09 PROBLEM — R26.2 AMBULATORY DYSFUNCTION: Status: ACTIVE | Noted: 2022-03-09

## 2022-03-09 NOTE — ASSESSMENT & PLAN NOTE
 Maintain fall and safety precautions   Encourage use of call bell   Continue to encourage use of assistive device   Assist with transfers, mobility, and ADLs

## 2022-03-09 NOTE — ASSESSMENT & PLAN NOTE
· Continue all drops  · Patient states no further problems with vision  · Follow-up with ophthalmology as needed

## 2022-03-09 NOTE — ASSESSMENT & PLAN NOTE
· Chronic lower extremity edema  · Continue Lasix 20 mg daily  · Continue to encourage elevation and apply compression stockings daily

## 2022-03-09 NOTE — ASSESSMENT & PLAN NOTE
· No further interventions at this time  · Continue nebulizer treatments  · Patient denies any shortness of breath  · Lungs clear today  · Continue to monitor pulmonary status

## 2022-03-09 NOTE — ASSESSMENT & PLAN NOTE
· Loss [place] test score 9/30  · Patient seems to be at baseline today  · Continue current level of care in [facility]  · Continue to encourage activity and engagement  · Continue supportive measures

## 2022-03-14 ENCOUNTER — IN HOME VISIT (OUTPATIENT)
Dept: GERIATRICS | Facility: OTHER | Age: 87
End: 2022-03-14
Payer: COMMERCIAL

## 2022-03-14 DIAGNOSIS — S31.109A WOUND OF LEFT GROIN, INITIAL ENCOUNTER: Primary | ICD-10-CM

## 2022-03-14 PROCEDURE — 99334 PR DOM/R-HOME E/M EST PT SELF-LMTD/MINOR 15 MINUTES: CPT | Performed by: NURSE PRACTITIONER

## 2022-03-14 NOTE — PROGRESS NOTES
[facility]  [address]  [phone]) [address]   Acute Visit Note  POS 13      NAME: Austyn MARROQUIN Case  AGE: 80 y o  SEX: female  :  1922  DATE OF ENCOUNTER: 3/14/2022    Chief Complaint   Patient seen and examined for follow up on chronic conditions  History of Present Illness     Austyn Fairchild is a 80year old patient of [employer] personal care [facility] seen and examined today per nursing request to examine a open area of her left groin area, under her abdominal fold  Upon examination at bedside, patient is calm, cooperative and in no acute distress  She is without complaint, states that she is tired and wants to take a nap  She denies fever, chills, chest pain, sob, abdominal pain  Nursing has been applying Calmoseptine cream to the wound  The following portions of the patient's history were reviewed and updated as appropriate: allergies, current medications, past family history, past medical history, past social history, past surgical history and problem list     Review of Systems     A review of systems was performed  All negative, except as per HPI  History     Past Medical History:   Diagnosis Date    Ambulatory dysfunction 3/9/2022    Cognitive impairment     Hearing loss due to cerumen impaction, bilateral 2021    Hyperlipidemia     Hypertension     Hypothyroidism     Macular degeneration     Vitamin D deficiency      Past Surgical History:   Procedure Laterality Date    APPENDECTOMY       SECTION      COLONOSCOPY N/A 3/31/2019    Procedure: COLONOSCOPY;  Surgeon: Ramin Cox MD;  [facility];  Service: Gastroenterology    ESOPHAGOGASTRODUODENOSCOPY N/A 3/31/2019    Procedure: ESOPHAGOGASTRODUODENOSCOPY (EGD);   Surgeon: Ramin Cox MD;  [facility];  Service: Gastroenterology    GALLBLADDER SURGERY       Family History   Problem Relation Age of Onset    Lung cancer Mother  Diabetes Father         mellitus    Atrial fibrillation Son      Social History     Socioeconomic History    Marital status:      Spouse name: Not on file    Number of children: Not on file    Years of education: Not on file    Highest education level: Not on file   Occupational History    Occupation: retired   Tobacco Use    Smoking status: Former Smoker    Smokeless tobacco: Never Used    Tobacco comment: never  smoker ( as per allscripts)   Substance and Sexual Activity    Alcohol use: No    Drug use: No    Sexual activity: Not on file   Other Topics Concern    Not on file   Social History Narrative    Lives with adult children     Social Determinants of Health     Financial Resource Strain: Not on file   Food Insecurity: Not on file   Transportation Needs: Not on file   Physical Activity: Not on file   Stress: Not on file   Social Connections: Not on file   Intimate Partner Violence: Not on file   Housing Stability: Not on file     No Known Allergies    Objective     Vital Signs  BP: 100/54       HR: 68  T: 97 5     RR: 118  O2Sat: 96% RA    W: 182 lbs  General: NAD, Well Nourished, Well Developed  Skin: Warm, Dry, skin tear of left groin area measuring 1 cm x 1 cm  No drainage, no warmth, no erythema present  Psych: Awake and alert, no mood, no affect      Current Medications     Current Medications Reviewed and updated in Nursing Home EMR      Assessment and Plan     Wound of left groin  · Wash wound daily with NSS and pat dry  · Apply ELBA with dry dressing daily  · Continue to monitor for signs and symptoms of infection      [facility]  3/14/2022

## 2022-03-15 NOTE — ASSESSMENT & PLAN NOTE
· Wash wound daily with NSS and pat dry  · Apply ELBA with dry dressing daily  · Continue to monitor for signs and symptoms of infection

## 2022-03-25 ENCOUNTER — IN HOME VISIT (OUTPATIENT)
Dept: GERIATRICS | Facility: OTHER | Age: 87
End: 2022-03-25
Payer: COMMERCIAL

## 2022-03-25 DIAGNOSIS — R26.2 AMBULATORY DYSFUNCTION: ICD-10-CM

## 2022-03-25 DIAGNOSIS — M25.552 LEFT HIP PAIN: Primary | ICD-10-CM

## 2022-03-25 PROCEDURE — 99335 PR DOM/R-HOME E/M EST PT LW MOD SEVERITY 25 MINUTES: CPT | Performed by: NURSE PRACTITIONER

## 2022-03-27 PROBLEM — M25.552 LEFT HIP PAIN: Status: ACTIVE | Noted: 2022-03-25

## 2022-03-27 PROBLEM — F03.90 DEMENTIA (HCC): Status: ACTIVE | Noted: 2020-05-08

## 2022-03-27 NOTE — ASSESSMENT & PLAN NOTE
· History of falls  · Patient ambulates with roller walker independently  · Will order PT services due to falls and complaint of left hip pain     · Maintain fall precautions

## 2022-03-27 NOTE — ASSESSMENT & PLAN NOTE
· Pain currently subsided  · Will order a x-ray of her left hip and pelvis due to 2 falls earlier this month  · Continue tylenol prn

## 2022-03-27 NOTE — PROGRESS NOTES
[address] [address] [phone]) [address]   Acute Visit Note  POS 13      NAME: Austyn MARROQUIN Case  AGE: 80 y o  SEX: female  :  1922  DATE OF ENCOUNTER: 2022    Chief Complaint   Patient seen and examined for complaint of left hip pain and ambulatory dysfunction    History of Present Illness     Austyn Fairchild is a 80year old female patient of [employer] personal care facility seen and examined today for left hip pain and ambulatory dysfunction  Patient reported to her son that her left hip is painful and she is having difficulty walking because of the pain  Upon examination, patient is calm, cooperative and in no acute distress  She currently denies left hip pain but states that earlier in the day it was hurting her  She was able to walk with her walker with no difficulty  She denies chest pain, sob, abdominal pain, n/v/d  No recent falls reported, earlier in the month she suffered 2 falls with no reported injuries  The following portions of the patient's history were reviewed and updated as appropriate: allergies, current medications, past family history, past medical history, past social history, past surgical history and problem list     Review of Systems     A review of systems was performed  All negative, except as per HPI      History     Past Medical History:   Diagnosis Date    Ambulatory dysfunction 3/9/2022    Cognitive impairment     Hearing loss due to cerumen impaction, bilateral 2021    Hyperlipidemia     Hypertension     Hypothyroidism     Macular degeneration     Vitamin D deficiency      Past Surgical History:   Procedure Laterality Date    APPENDECTOMY       SECTION      COLONOSCOPY N/A 3/31/2019    Procedure: COLONOSCOPY;  Surgeon: Akira Shipley MD;  [facility];  Service: Gastroenterology    ESOPHAGOGASTRODUODENOSCOPY N/A 3/31/2019    Procedure: ESOPHAGOGASTRODUODENOSCOPY (EGD); Surgeon: Tc Bustillos MD;  [facility];  Service: Gastroenterology    GALLBLADDER SURGERY       Family History   Problem Relation Age of Onset    Lung cancer Mother     Diabetes Father         mellitus    Atrial fibrillation Son      Social History     Socioeconomic History    Marital status:      Spouse name: Not on file    Number of children: Not on file    Years of education: Not on file    Highest education level: Not on file   Occupational History    Occupation: retired   Tobacco Use    Smoking status: Former Smoker    Smokeless tobacco: Never Used    Tobacco comment: never  smoker ( as per allscripts)   Substance and Sexual Activity    Alcohol use: No    Drug use: No    Sexual activity: Not on file   Other Topics Concern    Not on file   Social History Narrative    Lives with adult children     Social Determinants of Health     Financial Resource Strain: Not on file   Food Insecurity: Not on file   Transportation Needs: Not on file   Physical Activity: Not on file   Stress: Not on file   Social Connections: Not on file   Intimate Partner Violence: Not on file   Housing Stability: Not on file     No Known Allergies    Objective     Vital Signs  BP: 112/71       HR: 68 T: 97 2     RR: 18  O2Sat 92% RA  General: NAD, Well Nourished, Well Developed  Oral: Oropharynx Moist and Clear  Neck: Supple, +ROM  CV: S1, S2, normal rate, regular rhythm, no murmur appreciated  Pulmonary: Lung sounds clear to air, no wheezing, rhonchi, Fine rales RLL(chronic)  Abdominal:BS + x4 in all quadrants, soft, no mass, no tenderness  Extremities: Mild bilateral lower extremity edema, +ROM, +Strength  Skin: Warm, Dry  Psych: Awake and alert, no mood, no affect    Pertinent Laboratory/Diagnostic Studies:  Reviewed in nursing home EMR  Current Medications     Current Medications Reviewed and updated in Nursing Home EMR      Assessment and Plan     Left hip pain  · Pain currently subsided  · Will order a x-ray of her left hip and pelvis due to 2 falls earlier this month  · Continue tylenol prn    Ambulatory dysfunction  · History of falls  · Patient ambulates with roller walker independently  · Will order PT services due to falls and complaint of left hip pain     · Maintain fall precautions      [facility]  Geriatric Medicine  03/25/2022

## 2022-05-12 ENCOUNTER — TELEPHONE (OUTPATIENT)
Dept: GERIATRICS | Facility: OTHER | Age: 87
End: 2022-05-12

## 2022-05-13 ENCOUNTER — IN HOME VISIT (OUTPATIENT)
Dept: GERIATRICS | Facility: OTHER | Age: 87
End: 2022-05-13
Payer: COMMERCIAL

## 2022-05-13 DIAGNOSIS — I10 BENIGN ESSENTIAL HYPERTENSION: ICD-10-CM

## 2022-05-13 DIAGNOSIS — B37.3 VAGINAL CANDIDIASIS: Primary | ICD-10-CM

## 2022-05-13 DIAGNOSIS — F03.90 DEMENTIA WITHOUT BEHAVIORAL DISTURBANCE, UNSPECIFIED DEMENTIA TYPE (HCC): ICD-10-CM

## 2022-05-13 PROCEDURE — 99214 OFFICE O/P EST MOD 30 MIN: CPT | Performed by: NURSE PRACTITIONER

## 2022-05-13 NOTE — PROGRESS NOTES
[address] [address]  ([phone]) [address]   Acute Visit Note  POS 13      NAME: Austyn MARROQUIN Case  AGE: Aaron  [age] y o  SEX: female  :  1922  DATE OF ENCOUNTER: 2022    Chief Complaint   Patient seen and examined for vaginal and right groin irritation    History of Present Illness     Austyn Fairchild is a 8 year old female patient seen and examined today in her personal care room per nursing request for vaginal and right groin irritation  Upon examination, patient is awake, alert and in no acute distress  She is happy that today is her 89th birthday  She does report that her vaginal area is very itchy  She denies vaginal pain, difficulty urinating, fever, chills, chest pain, sob, abdominal pain, n/v/d, constipation  The following portions of the patient's history were reviewed and updated as appropriate: allergies, current medications, past family history, past medical history, past social history, past surgical history and problem list     Review of Systems     A review of systems was performed  All negative, except as per HPI  History     Past Medical History:   Diagnosis Date    Ambulatory dysfunction 3/9/2022    Cognitive impairment     Hearing loss due to cerumen impaction, bilateral 2021    Hyperlipidemia     Hypertension     Hypothyroidism     Macular degeneration     Vitamin D deficiency      Past Surgical History:   Procedure Laterality Date    APPENDECTOMY       SECTION      COLONOSCOPY N/A 3/31/2019    Procedure: COLONOSCOPY;  Surgeon: Ciaran Graves MD;  [facility];  Service: Gastroenterology    ESOPHAGOGASTRODUODENOSCOPY N/A 3/31/2019    Procedure: ESOPHAGOGASTRODUODENOSCOPY (EGD);   Surgeon: Ciaran Graves MD;  [facility];  Service: Gastroenterology    GALLBLADDER SURGERY       Family History   Problem Relation Age of Onset    Lung cancer Mother     Diabetes Father         mellitus    Atrial fibrillation Son      Social History     Socioeconomic History    Marital status:      Spouse name: Not on file    Number of children: Not on file    Years of education: Not on file    Highest education level: Not on file   Occupational History    Occupation: retired   Tobacco Use    Smoking status: Former Smoker    Smokeless tobacco: Never Used    Tobacco comment: never  smoker ( as per allscripts)   Substance and Sexual Activity    Alcohol use: No    Drug use: No    Sexual activity: Not on file   Other Topics Concern    Not on file   Social History Narrative    Lives with adult children     Social Determinants of Health     Financial Resource Strain: Not on file   Food Insecurity: Not on file   Transportation Needs: Not on file   Physical Activity: Not on file   Stress: Not on file   Social Connections: Not on file   Intimate Partner Violence: Not on file   Housing Stability: Not on file     No Known Allergies    Objective     Vital Signs  BP: 91/54      HR: 78 T: 97    RR:20 O2Sat: 97% RA W: 184 lbs  General: NAD, Well Nourished, Well Developed  CV: S1, S2, normal rate, regular rhythm  Pulmonary: Lung sounds clear to air, no wheezing, rhonchi, rales  Abdominal:BS + x4 in all quadrants, soft, no mass, no tenderness  :  Right groin erythema, vaginal erythema present, small amount of white vaginal discharge present  Skin: Warm, Dry  Psych: Alert and oriented times self and place, disoriented to time, no mood, no affect    Pertinent Laboratory/Diagnostic Studies:  Reviewed in nursing home EMR  Current Medications     Current Medications Reviewed and updated in Nursing Home EMR  Assessment and Plan     Vaginal candidiasis  · Miconazole, 1 application ordered nightly  · Encourage patient to keep jose area clean and dry     · Continue Nystatin powder to right groin bid     Benign essential hypertension  · BP currently soft  · Will monitor vital signs and consider decreasing bp medication regime if indicated  · Will monitor electrolytes and renal function    Dementia ([facility] [address] )  · Stable, patient at baseline  · Redirect, reorient and reassure  · Encourage engagement in unit activities and socialization  · Monitor for delirium      [facility]  05/13/2022 no

## 2022-05-15 PROBLEM — B37.3 VAGINAL CANDIDIASIS: Status: ACTIVE | Noted: 2022-05-13

## 2022-05-15 PROBLEM — B37.31 VAGINAL CANDIDIASIS: Status: ACTIVE | Noted: 2022-05-13

## 2022-05-15 RX ORDER — NYSTATIN 100000 [USP'U]/G
POWDER TOPICAL 2 TIMES DAILY
COMMUNITY

## 2022-05-15 NOTE — ASSESSMENT & PLAN NOTE
· Miconazole, 1 application ordered nightly  · Encourage patient to keep jose area clean and dry     · Continue Nystatin powder to right groin bid

## 2022-05-15 NOTE — ASSESSMENT & PLAN NOTE
· Stable, patient at baseline  · Redirect, reorient and reassure  · Encourage engagement in unit activities and socialization  · Monitor for delirium

## 2022-05-15 NOTE — ASSESSMENT & PLAN NOTE
· BP currently soft  · Will monitor vital signs and consider decreasing bp medication regime if indicated  · Will monitor electrolytes and renal function

## 2022-07-05 ENCOUNTER — IN HOME VISIT (OUTPATIENT)
Dept: GERIATRICS | Facility: OTHER | Age: 87
End: 2022-07-05
Payer: COMMERCIAL

## 2022-07-05 DIAGNOSIS — J18.9 PNEUMONIA OF BOTH LUNGS DUE TO INFECTIOUS ORGANISM, UNSPECIFIED PART OF LUNG: ICD-10-CM

## 2022-07-05 DIAGNOSIS — I10 BENIGN ESSENTIAL HYPERTENSION: ICD-10-CM

## 2022-07-05 DIAGNOSIS — J96.01 ACUTE RESPIRATORY FAILURE WITH HYPOXIA (HCC): Primary | ICD-10-CM

## 2022-07-05 DIAGNOSIS — F03.90 DEMENTIA WITHOUT BEHAVIORAL DISTURBANCE, UNSPECIFIED DEMENTIA TYPE: ICD-10-CM

## 2022-07-05 PROCEDURE — 99336 PR DOM/R-HOME E/M EST PT MOD HI SEVERITY 40 MINUTES: CPT | Performed by: NURSE PRACTITIONER

## 2022-07-05 NOTE — PROGRESS NOTES
[address] [address] (733) [address]   Acute Visit  Note  POS 13      NAME: Austyn MARROQUIN Case  AGE: 80 y o  SEX: female  :  1922  DATE OF ENCOUNTER: 2022    Chief Complaint   Patient seen and examined for acute respiratory failure with hypoxia      History of Present Illness     Austyn Fairchild is a 8 year old female patient seen and examined today per nursing request for acute respiratory failure with hypoxia  Patient is reported to have low oxygen saturations with increased sob both at rest and with exertion  A chest x-ray was ordered with abnormal findings  Findings suggest bilateral pneumonia  Upon examination at bedside, patient is calm, cooperative and in no acute distress  She states that she is tired  She currently denies chest pain, cough, abdominal pain, n/v/d, headache, dizziness, fevers, chills  She states that she does become sob when she walks long distances  Nursing reports that her oxygen saturation dropped to the 80's with ambulation  The following portions of the patient's history were reviewed and updated as appropriate: allergies, current medications, past family history, past medical history, past social history, past surgical history and problem list     Review of Systems     A review of systems was performed  All negative, except as per HPI      History     Past Medical History:   Diagnosis Date    Ambulatory dysfunction 3/9/2022    Cognitive impairment     Hearing loss due to cerumen impaction, bilateral 2021    Hyperlipidemia     Hypertension     Hypothyroidism     Macular degeneration     Vitamin D deficiency      Past Surgical History:   Procedure Laterality Date    APPENDECTOMY       SECTION      COLONOSCOPY N/A 3/31/2019    Procedure: COLONOSCOPY;  Surgeon: Ramin Cox MD;  [facility];  Service: Gastroenterology    ESOPHAGOGASTRODUODENOSCOPY N/A 3/31/2019 Procedure: ESOPHAGOGASTRODUODENOSCOPY (EGD); Surgeon: Arnoldo Barragan MD;  [facility];  Service: Gastroenterology    GALLBLADDER SURGERY       Family History   Problem Relation Age of Onset    Lung cancer Mother     Diabetes Father         mellitus    Atrial fibrillation Son      Social History     Socioeconomic History    Marital status:      Spouse name: Not on file    Number of children: Not on file    Years of education: Not on file    Highest education level: Not on file   Occupational History    Occupation: retired   Tobacco Use    Smoking status: Former Smoker    Smokeless tobacco: Never Used    Tobacco comment: never  smoker ( as per allscripts)   Substance and Sexual Activity    Alcohol use: No    Drug use: No    Sexual activity: Not on file   Other Topics Concern    Not on file   Social History Narrative    Lives with adult children     Social Determinants of Health     Financial Resource Strain: Not on file   Food Insecurity: Not on file   Transportation Needs: Not on file   Physical Activity: Not on file   Stress: Not on file   Social Connections: Not on file   Intimate Partner Violence: Not on file   Housing Stability: Not on file     No Known Allergies    Objective     Vital Signs  BP: 100/52     HR 52 T: 97 6    RR: 16 O2Sat: 90%RA W: 188 lbs  General: NAD, Well Nourished, Well Developed  Oral: Oropharynx Moist and Clear  Neck: Supple, +ROM  CV: S1, S2, normal rate, regular rhythm, no murmur appreciated  Pulmonary: Lung sounds clear to air, no wheezing, rhonchi, rales, diminish breath sounds in bilateral bases  Abdominal:BS + x4 in all quadrants, soft, no mass, no tenderness  Extremities: No edema, +ROM, +Strength  Skin: Warm, Dry, no lesions, no rash, no erythema present, no ecchymosis present  Neurological: No cranial nerve deficits     Psych: Awake and alert, no mood, no affect, good judgement    Pertinent Laboratory/Diagnostic Studies:  07/02/2022  PROCEDURE: XRAY CHEST 2 VIEW  STATUS: Final  INTERPRETATION:  Reason for Study: R06 02 CLEAR V[facility]IOR[facility] OF BREATH  Principal Result : August Cardozo ([id number])  [facility]  Transcription Technician: LE    XRAY CHEST 2 VIEW Comparison: Please see the body of the report for the comparison date  See Note  FINDINGS: Comparison is dated 9/24/2021  Heart is enlarged  There are multifocal bilateral small lung infiltrates new from the prior study  Findings are not typical for CHF  No pleural effusion, masses or pneumothorax  There is a calcified atherosclerosis of the aorta  CONCLUSION: Small patchy bilateral lung infiltrates new from the prior study  These findings in the appropriate clinical settings would be compatible with pneumonia  LOPEZ Haer  7/2/2022 5:38:02 PM EDT  Current Medications     Current Medications Reviewed and updated in Nursing Home EMR  Assessment and Plan     Acute respiratory failure with hypoxia (HCC)  · In the setting of bilateral pneumonia  · Augmentin ordered two times daily for 7 days  · Continue duo-neb treatments  · Continue to monitor oxygen saturation  · Will order home oxygen if needed    Pneumonia  · Augmentin ordered for 7 days  · Continue Duo-neb treatments  · Continue prn cough medicaiton  · Monitor oxygen saturation and vital signs frequently  · Encourage deep breathing exercises    Benign essential hypertension  · BP trend shows occasional soft blood pressures    · Continue Losartan and Metoprolol   · Will monitor electrolytes and renal function    Dementia ([address] )  · Stable, patient at baseline  · Redirect, reorient and reassure  · Encourage engagement in unit activities and socialization  · Monitor for delirium in the setting of acute illness      [facility]  07/05/2022

## 2022-07-11 PROBLEM — J96.01 ACUTE RESPIRATORY FAILURE WITH HYPOXIA (HCC): Status: ACTIVE | Noted: 2022-07-05

## 2022-07-11 PROBLEM — J18.9 PNEUMONIA: Status: ACTIVE | Noted: 2022-07-05

## 2022-07-12 NOTE — ASSESSMENT & PLAN NOTE
· BP trend shows occasional soft blood pressures    · Continue Losartan and Metoprolol   · Will monitor electrolytes and renal function

## 2022-07-12 NOTE — ASSESSMENT & PLAN NOTE
· Stable, patient at baseline  · Redirect, reorient and reassure  · Encourage engagement in unit activities and socialization  · Monitor for delirium in the setting of acute illness

## 2022-07-12 NOTE — ASSESSMENT & PLAN NOTE
· In the setting of bilateral pneumonia  · Augmentin ordered two times daily for 7 days  · Continue duo-neb treatments  · Continue to monitor oxygen saturation  · Will order home oxygen if needed

## 2022-07-12 NOTE — ASSESSMENT & PLAN NOTE
· Augmentin ordered for 7 days  · Continue Duo-neb treatments  · Continue prn cough medicaiton  · Monitor oxygen saturation and vital signs frequently  · Encourage deep breathing exercises

## 2022-08-08 ENCOUNTER — TELEPHONE (OUTPATIENT)
Dept: GERIATRICS | Facility: OTHER | Age: 87
End: 2022-08-08

## 2022-08-11 ENCOUNTER — OFFICE VISIT (OUTPATIENT)
Dept: GERIATRICS | Facility: OTHER | Age: 87
End: 2022-08-11
Payer: COMMERCIAL

## 2022-08-11 VITALS
SYSTOLIC BLOOD PRESSURE: 110 MMHG | RESPIRATION RATE: 16 BRPM | HEART RATE: 64 BPM | TEMPERATURE: 97.5 F | OXYGEN SATURATION: 99 % | DIASTOLIC BLOOD PRESSURE: 70 MMHG

## 2022-08-11 DIAGNOSIS — I10 BENIGN ESSENTIAL HYPERTENSION: ICD-10-CM

## 2022-08-11 DIAGNOSIS — R26.2 AMBULATORY DYSFUNCTION: ICD-10-CM

## 2022-08-11 DIAGNOSIS — F03.90 DEMENTIA WITHOUT BEHAVIORAL DISTURBANCE, UNSPECIFIED DEMENTIA TYPE: Primary | ICD-10-CM

## 2022-08-11 DIAGNOSIS — H35.3190 NONEXUDATIVE AGE-RELATED MACULAR DEGENERATION, UNSPECIFIED LATERALITY, UNSPECIFIED STAGE: ICD-10-CM

## 2022-08-11 DIAGNOSIS — E03.9 ACQUIRED HYPOTHYROIDISM: ICD-10-CM

## 2022-08-11 DIAGNOSIS — J41.0 SIMPLE CHRONIC BRONCHITIS (HCC): ICD-10-CM

## 2022-08-11 PROCEDURE — 99214 OFFICE O/P EST MOD 30 MIN: CPT | Performed by: NURSE PRACTITIONER

## 2022-08-11 RX ORDER — METOPROLOL SUCCINATE 50 MG/1
50 TABLET, EXTENDED RELEASE ORAL
COMMUNITY
Start: 2022-07-01

## 2022-08-11 RX ORDER — POTASSIUM CHLORIDE 750 MG/1
TABLET, FILM COATED, EXTENDED RELEASE ORAL
COMMUNITY
Start: 2022-07-01 | End: 2022-08-12 | Stop reason: SDUPTHER

## 2022-08-11 NOTE — PROGRESS NOTES
[facility]  [address] [facility], [address]  [phone]    Older Adult Primary Care at [address]:  1  Dementia without behavioral disturbance, unspecified dementia type [facility])  Assessment & Plan:  · Stable, patient at baseline  · Redirect, reorient and reassure  · Encourage engagement in unit activities and socialization  · Transfer to the personal care [facility] memory unit      2  Benign essential hypertension  Assessment & Plan:  · Frequent soft blood pressure and heart rate reported  · Will decrease Metoprolol to 50 mg PO daily  · BP and HR printout in one week  · Will continue to wean off of Metoprolol   · Furosemide changed to prn for increased sob/edema  · Continue Losartan 50 mg po daily      3  Acquired hypothyroidism  Assessment & Plan:  · Continue Levothryroxine 112 mcg daily  · Patient due for repeat TSH level with free T4       4  Simple chronic bronchitis (HCC)  Assessment & Plan:  · Patient with chronic bronchitis with wheezing  · Continue duo-neb treatments  · Will continue to monitor oxygen saturation to keep > 92%      5  Nonexudative age-related macular degeneration, unspecified laterality, unspecified stage  Assessment & Plan:  · Continue eye drops  · Follow-up with Ophthalmology as needed      6  Ambulatory dysfunction  Assessment & Plan:  · History of falls  · Patient ambulates with roller walker independently  · Maintain fall precautions            HPI:    Austyn Fairchild is a 8 year old female patient seen and examined with her son and daughter-in-law present to follow-up on acute and chronic medical conditions and for medication review  She has a history of dementia, essential hypertension, hypothyroidism, chronic bronchitis, mixed HLD vitamin D deficiency  She currently resides on the personal care unit at [address] with plans to transition to the memory care unit      ROS: Review of Systems Constitutional: Negative for chills, diaphoresis, fatigue and fever  HENT: Negative for congestion  Eyes: Positive for visual disturbance  Negative for pain, discharge, redness and itching  Chronic visual disturbance due to history of macular degeneration   Respiratory: Negative for cough, chest tightness, shortness of breath and wheezing  Cardiovascular: Negative for chest pain, palpitations and leg swelling  Gastrointestinal: Negative for abdominal distention, abdominal pain, constipation, diarrhea, nausea and vomiting  Genitourinary: Negative for difficulty urinating, dysuria, frequency and hematuria  Musculoskeletal: Positive for gait problem  Negative for arthralgias, back pain and myalgias  Skin: Positive for rash  Negative for color change, pallor and wound  Rash under breasts   Neurological: Negative for dizziness, weakness, numbness and headaches  Psychiatric/Behavioral: Negative for dysphoric mood  The patient is not nervous/anxious  No Known Allergies    Medications:    Current Outpatient Medications on File Prior to Visit   Medication Sig Dispense Refill    acetaminophen (TYLENOL) 325 mg tablet Take by mouth every 6 (six) hours as needed for mild pain or fever AND GENERAL DISCOMFORT        Cholecalciferol (VITAMIN D) 2000 units CAPS Take 1 capsule (2,000 Units total) by mouth daily 30 capsule 5    Corn Starch POWD Apply topically      furosemide (LASIX) 20 mg tablet Take 20 mg by mouth daily      ipratropium-albuterol (DUO-NEB) 0 5-2 5 mg/3 mL nebulizer solution Take 3 mL by nebulization 3 (three) times a day      levothyroxine 112 mcg tablet Take 1 tablet (112 mcg total) by mouth daily 90 tablet 3    losartan (COZAAR) 50 mg tablet TAKE 1 TABLET BY MOUTH EVERY DAY 90 tablet 3    magnesium hydroxide (MILK OF MAGNESIA) 400 mg/5 mL oral suspension Take 30 mL by mouth daily as needed for constipation      Melatonin 3 MG CAPS Take 6 mg by mouth every evening  polyethylene glycol-propylene glycol (SYSTANE) 0 4-0 3 % Administer 1 drop to both eyes 3 (three) times a day FOR DRY EYES      potassium chloride (K-DUR,KLOR-CON) 10 mEq tablet Take 10 mEq by mouth daily as needed As needed when prn lasix is given      [DISCONTINUED] nystatin (MYCOSTATIN) cream       metoprolol succinate (TOPROL-XL) 50 mg 24 hr tablet 50 mg      [DISCONTINUED] guaiFENesin (ROBITUSSIN) 100 MG/5ML oral liquid Take 200 mg by mouth 4 (four) times a day as needed for cough (Patient not taking: Reported on 2022)      [DISCONTINUED] irbesartan (AVAPRO) 75 mg tablet Take 1 tablet (75 mg total) by mouth daily at bedtime (Patient not taking: No sig reported) 30 tablet 5    [DISCONTINUED] potassium chloride (Klor-Con) 10 mEq tablet        No current facility-administered medications on file prior to visit  History:  Past Medical History:   Diagnosis Date    Ambulatory dysfunction 3/9/2022    Cognitive impairment     Hearing loss due to cerumen impaction, bilateral 2021    Hyperlipidemia     Hypertension     Hypothyroidism     Macular degeneration     Vitamin D deficiency      Past Surgical History:   Procedure Laterality Date    APPENDECTOMY       SECTION      COLONOSCOPY N/A 3/31/2019    Procedure: COLONOSCOPY;  Surgeon: Caleb England MD;  [facility];  Service: Gastroenterology    ESOPHAGOGASTRODUODENOSCOPY N/A 3/31/2019    Procedure: ESOPHAGOGASTRODUODENOSCOPY (EGD); Surgeon: Caleb England MD;  [facility];  Service: Gastroenterology    GALLBLADDER SURGERY       Family History   Problem Relation Age of Onset    Lung cancer Mother     Diabetes Father         mellitus    Atrial fibrillation Son      Social History     Socioeconomic History    Marital status:       Spouse name: Not on file    Number of children: Not on file    Years of education: Not on file    Highest education level: Not on file   Occupational History    Occupation: retired   Tobacco Use    Smoking status: Former Smoker    Smokeless tobacco: Never Used    Tobacco comment: never  smoker ( as per allscripts)   Substance and Sexual Activity    Alcohol use: No    Drug use: No    Sexual activity: Not on file   Other Topics Concern    Not on file   Social History Narrative    Lives with adult children     Social Determinants of Health     Financial Resource Strain: Not on file   Food Insecurity: Not on file   Transportation Needs: Not on file   Physical Activity: Not on file   Stress: Not on file   Social Connections: Not on file   Intimate Partner Violence: Not on file   Housing Stability: Not on file     Past Surgical History:   Procedure Laterality Date    APPENDECTOMY       SECTION      COLONOSCOPY N/A 3/31/2019    Procedure: COLONOSCOPY;  Surgeon: Akira Shipley MD;  [facility];  Service: Gastroenterology    ESOPHAGOGASTRODUODENOSCOPY N/A 3/31/2019    Procedure: ESOPHAGOGASTRODUODENOSCOPY (EGD); Surgeon: Akira Shipley MD;  [facility];  Service: Gastroenterology    GALLBLADDER SURGERY         OBJECTIVE:  Vitals:    22 1147   BP: 110/70   BP Location: Left arm   Patient Position: Sitting   Cuff Size: Large   Pulse: 64   Resp: 16   Temp: 97 5 °F (36 4 °C)   TempSrc: Tympanic   SpO2: 99%     There is no height or weight on file to calculate BMI  Physical Exam  Constitutional:       General: She is not in acute distress  Appearance: Normal appearance  She is not ill-appearing, toxic-appearing or diaphoretic  HENT:      Head: Normocephalic and atraumatic  Right Ear: Tympanic membrane, ear canal and external ear normal  There is no impacted cerumen  Left Ear: Tympanic membrane, ear canal and external ear normal  There is no impacted cerumen  Nose: Nose normal  No congestion or rhinorrhea  Mouth/Throat:      Mouth: Mucous membranes are moist       Pharynx: Oropharynx is clear   No oropharyngeal exudate or posterior oropharyngeal erythema  Eyes:      General:         Right eye: No discharge  Left eye: No discharge  Conjunctiva/sclera: Conjunctivae normal       Pupils: Pupils are equal, round, and reactive to light  Comments: Chronic bilateral lower lid ectropion   Cardiovascular:      Rate and Rhythm: Normal rate and regular rhythm  Pulses: Normal pulses  Heart sounds: Normal heart sounds  Pulmonary:      Effort: Pulmonary effort is normal  No respiratory distress  Breath sounds: Wheezing present  No rhonchi or rales  Comments: Audible wheezes in the upper airways  Abdominal:      General: Bowel sounds are normal  There is no distension  Palpations: Abdomen is soft  There is no mass  Tenderness: There is no abdominal tenderness  There is no guarding  Musculoskeletal:         General: No signs of injury  Normal range of motion  Cervical back: Normal range of motion and neck supple  No rigidity  Right lower leg: No edema  Left lower leg: No edema  Skin:     General: Skin is warm and dry  Coloration: Skin is not jaundiced  Findings: No bruising, erythema or lesion  Comments: Chronic changes   Neurological:      General: No focal deficit present  Mental Status: She is alert  Mental status is at baseline  Motor: No weakness        Gait: Gait abnormal    Psychiatric:         Mood and Affect: Mood normal          Behavior: Behavior normal          Labs & Imaging:  Lab Results   Component Value Date    WBC 6 36 05/12/2020    HGB 12 0 05/12/2020    HCT 40 4 05/12/2020    MCV 91 05/12/2020     05/12/2020     Lab Results   Component Value Date    SODIUM 138 05/12/2020    K 3 9 05/12/2020     05/12/2020    CO2 27 05/12/2020    BUN 20 05/12/2020    CREATININE 0 85 05/12/2020    GLUC 103 04/01/2019    CALCIUM 9 4 05/12/2020     Lab Results   Component Value Date    DAFZROUS20 213 05/12/2020     Lab Results   Component Value Date    ZMC6WAMLBGMH 1 030 05/12/2020     Lab Results   Component Value Date    [id number] 37 2 05/12/2020      No results found for this or any previous visit

## 2022-08-11 NOTE — PATIENT INSTRUCTIONS
Discontinue Nystatin Cream  Decrease Metoprolol to 50 mg daily  Change Furosemide to 20 mg PO as needed for increased sob or edema  Change the potassium supplement to be given prn, with the Furosemide as needed    Monitor and notify if patient is having loose stools or constipation  Blood pressure and heart rate printout in one week  Follow-up on unit yearly and as needed

## 2022-08-12 NOTE — ASSESSMENT & PLAN NOTE
· Patient with chronic bronchitis with wheezing  · Continue duo-neb treatments  · Will continue to monitor oxygen saturation to keep > 92%

## 2022-08-12 NOTE — ASSESSMENT & PLAN NOTE
· History of falls  · Patient ambulates with roller walker independently  · Maintain fall precautions

## 2022-08-12 NOTE — ASSESSMENT & PLAN NOTE
· Stable, patient at baseline  · Redirect, reorient and reassure  · Encourage engagement in unit activities and socialization  · Transfer to the personal care [facility] memory unit

## 2022-08-12 NOTE — ASSESSMENT & PLAN NOTE
· Frequent soft blood pressure and heart rate reported  · Will decrease Metoprolol to 50 mg PO daily  · BP and HR printout in one week  · Will continue to wean off of Metoprolol   · Furosemide changed to prn for increased sob/edema  · Continue Losartan 50 mg po daily

## 2022-09-06 ENCOUNTER — VBI (OUTPATIENT)
Dept: ADMINISTRATIVE | Facility: OTHER | Age: 87
End: 2022-09-06

## 2022-10-11 PROBLEM — J18.9 PNEUMONIA: Status: RESOLVED | Noted: 2022-07-05 | Resolved: 2022-10-11

## 2022-11-17 ENCOUNTER — NURSING HOME VISIT (OUTPATIENT)
Dept: GERIATRICS | Facility: OTHER | Age: 87
End: 2022-11-17

## 2022-11-17 DIAGNOSIS — R22.32 LOCALIZED SWELLING OF LEFT THUMB: Primary | ICD-10-CM

## 2022-11-17 DIAGNOSIS — G89.29 CHRONIC BILATERAL LOW BACK PAIN WITHOUT SCIATICA: ICD-10-CM

## 2022-11-17 DIAGNOSIS — M54.50 CHRONIC BILATERAL LOW BACK PAIN WITHOUT SCIATICA: ICD-10-CM

## 2022-11-17 DIAGNOSIS — R53.1 GENERALIZED WEAKNESS: ICD-10-CM

## 2022-11-18 NOTE — PROGRESS NOTES
[address] [address]  ([phone]) [address]   Acute Visit Note  POS 13      NAME: Austyn MARROQUIN Case  AGE: 80 y o  SEX: female  :  1922  DATE OF ENCOUNTER: 2022    Chief Complaint   Patient seen and examined for left thumb/hand swelling/erythema, low back pain, generalized weakness    History of Present Illness     Austyn Fairchild is a 8 year old female patient of the personal care [facility] memory unit seen and examined today per nursing request for left thumb/hand swelling with erythema, low back pain and generalized weakness  Patient has a history of dementia and is unable to provide a reliable ROS  Nursing denies recent injury or falls  Patient's son reports that he noticed that he noticed that his mom is having increased weakness and difficulty getting up from a sitting position  Upon examination, patient is awake, alert and in no acute distress  She currently denies pain  The following portions of the patient's history were reviewed and updated as appropriate: allergies, current medications, past family history, past medical history, past social history, past surgical history and problem list     Review of Systems     Unable to obtain related to dementia  History     Past Medical History:   Diagnosis Date   • Ambulatory dysfunction 3/9/2022   • Cognitive impairment    • Hearing loss due to cerumen impaction, bilateral 2021   • Hyperlipidemia    • Hypertension    • Hypothyroidism    • Macular degeneration    • Vitamin D deficiency      Past Surgical History:   Procedure Laterality Date   • APPENDECTOMY     •  SECTION     • COLONOSCOPY N/A 3/31/2019    Procedure: COLONOSCOPY;  Surgeon: Coco Torres MD;  [facility];  Service: Gastroenterology   • ESOPHAGOGASTRODUODENOSCOPY N/A 3/31/2019    Procedure: ESOPHAGOGASTRODUODENOSCOPY (EGD);   Surgeon: Coco Torres MD;  [facility];  Service: Gastroenterology • GALLBLADDER SURGERY       Family History   Problem Relation Age of Onset   • Lung cancer Mother    • Diabetes Father         mellitus   • Atrial fibrillation Son      Social History     Socioeconomic History   • Marital status:      Spouse name: Not on file   • Number of children: Not on file   • Years of education: Not on file   • Highest education level: Not on file   Occupational History   • Occupation: retired   Tobacco Use   • Smoking status: Former   • Smokeless tobacco: Never   • Tobacco comments:     never  smoker ( as per allscripts)   Substance and Sexual Activity   • Alcohol use: No   • Drug use: No   • Sexual activity: Not on file   Other Topics Concern   • Not on file   Social History Narrative    Lives with adult children     Social Determinants of Health     Financial Resource Strain: Not on file   Food Insecurity: Not on file   Transportation Needs: Not on file   Physical Activity: Not on file   Stress: Not on file   Social Connections: Not on file   Intimate Partner Violence: Not on file   Housing Stability: Not on file     No Known Allergies    Objective     Vital Signs  BP: 124/68     HR: 76 T: 97 4    RR: 22 O2Sat:97% RA  General: NAD, Well Nourished, Well Developed  Oral: Oropharynx Moist and Clear  Neck: Supple, +ROM  CV: S1, S2, normal rate, regular rhythm, no murmur appreciated  Pulmonary: Lung sounds clear to air, no wheezing, rhonchi, rales  Abdominal:BS + x4 in all quadrants, soft, no mass, no tenderness  Extremities: No edema, +ROM, +Strength, left thumb swelling  Skin: Warm, Dry, mild erythema of left thumb  Psych: Awake and alert, no mood, no affect    Pertinent Laboratory/Diagnostic Studies:  Reviewed in nursing home EMR  Current Medications     Current Medications Reviewed and updated in Nursing Home EMR      Assessment and Plan     Localized swelling of left thumb  · No injury or falls reported  · Will order an x-ray of left hand  · Continue prn Tylenol for pain    Chronic bilateral low back pain without sciatica  · Aspercreme topical analgesic ordered  · PT/OT to evaluate and treat  · Continue prn Tylenol    Generalized weakness  · Multifactorial  · Continue nutritional and ADL support  · Monitor skin integrity  · PT/OT consult to evaluate and treat  · Continue fall precautions      [address]  11/17/2022

## 2022-11-25 PROBLEM — R22.32 LOCALIZED SWELLING OF LEFT THUMB: Status: ACTIVE | Noted: 2022-11-17

## 2022-11-25 PROBLEM — G89.29 CHRONIC BILATERAL LOW BACK PAIN WITHOUT SCIATICA: Status: ACTIVE | Noted: 2022-11-17

## 2022-11-25 PROBLEM — M54.50 CHRONIC BILATERAL LOW BACK PAIN WITHOUT SCIATICA: Status: ACTIVE | Noted: 2022-11-17

## 2022-11-25 PROBLEM — R53.1 GENERALIZED WEAKNESS: Status: ACTIVE | Noted: 2022-11-17

## 2022-11-25 NOTE — ASSESSMENT & PLAN NOTE
· Multifactorial  · Continue nutritional and ADL support  · Monitor skin integrity  · PT/OT consult to evaluate and treat  · Continue fall precautions

## 2022-12-07 ENCOUNTER — IN HOME VISIT (OUTPATIENT)
Dept: GERIATRICS | Facility: OTHER | Age: 87
End: 2022-12-07

## 2022-12-07 DIAGNOSIS — R19.5 LOOSE STOOLS: Primary | ICD-10-CM

## 2022-12-07 DIAGNOSIS — R06.02 SHORTNESS OF BREATH: ICD-10-CM

## 2022-12-07 NOTE — PROGRESS NOTES
[address] [id number] (173) [address]   Acute Visit  Note  POS 13      NAME: Austyn MARROQUIN Case  AGE: 80 y o  SEX: female  :  1922  DATE OF ENCOUNTER: 2022    Chief Complaint   Patient seen and examined for loose stools    History of Present Illness     Austyn Fairchild is a 8 year old female patient with underlying dementia seen and examined today per nursing and family request for loose stools  It is reported that she is having multiple loose stools several times per week  Upon examination, with family present, patient is calm, cooperative and in no acute distress  She denies pain or discomfort  She is unable to complete full ROS related to dementia  The following portions of the patient's history were reviewed and updated as appropriate: allergies, current medications, past family history, past medical history, past social history, past surgical history and problem list     Review of Systems     Unable to obtain ROS related to underlying dementia    History     Past Medical History:   Diagnosis Date   • Ambulatory dysfunction 3/9/2022   • Cognitive impairment    • Hearing loss due to cerumen impaction, bilateral 2021   • Hyperlipidemia    • Hypertension    • Hypothyroidism    • Macular degeneration    • Vitamin D deficiency      Past Surgical History:   Procedure Laterality Date   • APPENDECTOMY     •  SECTION     • COLONOSCOPY N/A 3/31/2019    Procedure: COLONOSCOPY;  Surgeon: Navi Bryan MD;  [facility];  Service: Gastroenterology   • ESOPHAGOGASTRODUODENOSCOPY N/A 3/31/2019    Procedure: ESOPHAGOGASTRODUODENOSCOPY (EGD);   Surgeon: Navi Bryan MD;  [facility];  Service: Gastroenterology   • GALLBLADDER SURGERY       Family History   Problem Relation Age of Onset   • Lung cancer Mother    • Diabetes Father         mellitus   • Atrial fibrillation Son      Social History     Socioeconomic History   • Marital status:      Spouse name: Not on file   • Number of children: Not on file   • Years of education: Not on file   • Highest education level: Not on file   Occupational History   • Occupation: retired   Tobacco Use   • Smoking status: Former   • Smokeless tobacco: Never   • Tobacco comments:     never  smoker ( as per allscripts)   Substance and Sexual Activity   • Alcohol use: No   • Drug use: No   • Sexual activity: Not on file   Other Topics Concern   • Not on file   Social History Narrative    Lives with adult children     Social Determinants of Health     Financial Resource Strain: Not on file   Food Insecurity: Not on file   Transportation Needs: Not on file   Physical Activity: Not on file   Stress: Not on file   Social Connections: Not on file   Intimate Partner Violence: Not on file   Housing Stability: Not on file     No Known Allergies    Objective     Vital Signs  Reviewed in nursing home EMR  General: NAD, Well Nourished, Well Developed  Oral: Oropharynx Moist and Clear  Neck: Supple, +ROM  CV: S1, S2, normal rate, regular rhythm, no murmur appreciated  Pulmonary: Lung sounds clear to air, no wheezing, rhonchi, rales  Abdominal:BS + x4 in all quadrants, soft, no mass, no tenderness  Extremities: No edema, +ROM, +Strength  Skin: Warm, Dry, no lesions, no rash, no erythema present, no ecchymosis present  Psych: Alert and oriented times self, disoriented to time and place    Pertinent Laboratory/Diagnostic Studies:  Reviewed in nursing home EMR  Current Medications     Current Medications Reviewed and updated in Nursing Home EMR      Assessment and Plan     Loose stools  Patient reported to be afebrile  Will order stool specimen to rule out C-Diff  CBC with diff and CMP ordered  Banatrol TID ordered  Continue to encourage adequate hydration     Shortness of breath  Chronic, worse with exertion  Reported to be improved and request to decrease Duo-Neb to daily  Will decrease duo-neb to daily  Monitor for respiratory distress      [facility]  12/07/2022

## 2023-01-08 PROBLEM — R19.5 LOOSE STOOLS: Status: ACTIVE | Noted: 2022-12-07

## 2023-01-08 NOTE — ASSESSMENT & PLAN NOTE
Patient reported to be afebrile  Will order stool specimen to rule out C-Diff  CBC with diff and CMP ordered  Banatrol TID ordered  Continue to encourage adequate hydration

## 2023-01-08 NOTE — ASSESSMENT & PLAN NOTE
Chronic, worse with exertion  Reported to be improved and request to decrease Duo-Neb to daily  Will decrease duo-neb to daily  Monitor for respiratory distress

## 2023-02-16 ENCOUNTER — IN HOME VISIT (OUTPATIENT)
Dept: GERIATRICS | Facility: OTHER | Age: 88
End: 2023-02-16

## 2023-02-16 DIAGNOSIS — F03.90 DEMENTIA WITHOUT BEHAVIORAL DISTURBANCE, PSYCHOTIC DISTURBANCE, MOOD DISTURBANCE, OR ANXIETY, UNSPECIFIED DEMENTIA SEVERITY, UNSPECIFIED DEMENTIA TYPE (HCC): ICD-10-CM

## 2023-02-16 DIAGNOSIS — I10 BENIGN ESSENTIAL HYPERTENSION: Primary | ICD-10-CM

## 2023-02-16 NOTE — PROGRESS NOTES
[facility] at [facility] [address], [address] [facility]  [phone]    In Home Visit  POS 13    ASSESSMENT AND PLAN:  1  Benign essential hypertension  Assessment & Plan:  · Blood pressure trend reviewed with stable blood pressure and heart rate readings, per HPI  · Will discontinue Losartan to decrease risk of hypotension and decrease pill burden  · Continue Metoprolol 50 mg daily  · Monitor for hypotension and bradycardia  · Continue furosemide prn for sob/edema      2  Dementia without behavioral disturbance, psychotic disturbance, mood disturbance, or anxiety, unspecified dementia severity, unspecified dementia type ([facility] [address] )  Assessment & Plan:  · Stable, patient at baseline  · Redirect, reorient and reassure  · Encourage engagement in unit activities and socialization  · Monitor for delirium          Name: Josem Apley Case    :  1922              Sex: Female    HPI:    Patient seen and examined today to review blood pressure readings  Son, [place], requested that her blood pressure medication be evaluated  She had blood pressure and heart rate monitored bid for one week  Her blood pressures averaged 120-130's over 60-80's  Her heart rate averaged high 50's to 80's  ROS: Review of Systems   Unable to perform ROS: Dementia       No Known Allergies    Medications:    Current Outpatient Medications on File Prior to Visit   Medication Sig Dispense Refill   • acetaminophen (TYLENOL) 325 mg tablet Take by mouth every 6 (six) hours as needed for mild pain or fever AND GENERAL DISCOMFORT       • Banana Flakes (BANATROL PO) Take 1 Dose by mouth 3 (three) times a day Before meals     • Cholecalciferol (VITAMIN D) 2000 units CAPS Take 1 capsule (2,000 Units total) by mouth daily 30 capsule 5   • Corn Starch POWD Apply topically     • furosemide (LASIX) 20 mg tablet Take 20 mg by mouth daily     • ipratropium-albuterol (DUO-NEB) 0 5-2 5 mg/3 mL nebulizer solution Take 3 mL by nebulization in the morning     • levothyroxine 112 mcg tablet Take 1 tablet (112 mcg total) by mouth daily 90 tablet 3   • magnesium hydroxide (MILK OF MAGNESIA) 400 mg/5 mL oral suspension Take 30 mL by mouth daily as needed for constipation     • Melatonin 3 MG CAPS Take 6 mg by mouth every evening     • metoprolol succinate (TOPROL-XL) 50 mg 24 hr tablet 50 mg     • polyethylene glycol-propylene glycol (SYSTANE) 0 4-0 3 % Administer 1 drop to both eyes 3 (three) times a day FOR DRY EYES     • potassium chloride (K-DUR,KLOR-CON) 10 mEq tablet Take 10 mEq by mouth daily as needed As needed when prn lasix is given       No current facility-administered medications on file prior to visit  History:  Past Medical History:   Diagnosis Date   • Ambulatory dysfunction 3/9/2022   • Cognitive impairment    • Hearing loss due to cerumen impaction, bilateral 2021   • Hyperlipidemia    • Hypertension    • Hypothyroidism    • Macular degeneration    • Vitamin D deficiency      Past Surgical History:   Procedure Laterality Date   • APPENDECTOMY     •  SECTION     • COLONOSCOPY N/A 3/31/2019    Procedure: COLONOSCOPY;  Surgeon: Cynthia Zabala MD;  [facility];  Service: Gastroenterology   • ESOPHAGOGASTRODUODENOSCOPY N/A 3/31/2019    Procedure: ESOPHAGOGASTRODUODENOSCOPY (EGD); Surgeon: Cynthia Zabala MD;  [facility];  Service: Gastroenterology   • GALLBLADDER SURGERY       Family History   Problem Relation Age of Onset   • Lung cancer Mother    • Diabetes Father         mellitus   • Atrial fibrillation Son      Social History     Socioeconomic History   • Marital status:       Spouse name: Not on file   • Number of children: Not on file   • Years of education: Not on file   • Highest education level: Not on file   Occupational History   • Occupation: retired   Tobacco Use   • Smoking status: Former   • Smokeless tobacco: Never   • Tobacco comments:     never  smoker ( as per allscripts)   Substance and Sexual Activity   • Alcohol use: No   • Drug use: No   • Sexual activity: Not on file   Other Topics Concern   • Not on file   Social History Narrative    Lives with adult children     Social Determinants of Health     Financial Resource Strain: Not on file   Food Insecurity: Not on file   Transportation Needs: Not on file   Physical Activity: Not on file   Stress: Not on file   Social Connections: Not on file   Intimate Partner Violence: Not on file   Housing Stability: Not on file     Past Surgical History:   Procedure Laterality Date   • APPENDECTOMY     •  SECTION     • COLONOSCOPY N/A 3/31/2019    Procedure: COLONOSCOPY;  Surgeon: Jessica Parrish MD;  [facility];  Service: Gastroenterology   • ESOPHAGOGASTRODUODENOSCOPY N/A 3/31/2019    Procedure: ESOPHAGOGASTRODUODENOSCOPY (EGD); Surgeon: Jessica Parrish MD;  [facility];  Service: Gastroenterology   • GALLBLADDER SURGERY         OBJECTIVE:  Vital signs reviewed in nursing home EMR, [facility])  Physical Exam  Constitutional:       General: She is not in acute distress  Appearance: Normal appearance  She is not ill-appearing, toxic-appearing or diaphoretic  HENT:      Head: Normocephalic and atraumatic  Right Ear: External ear normal       Left Ear: External ear normal    Cardiovascular:      Rate and Rhythm: Normal rate and regular rhythm  Pulses: Normal pulses  Heart sounds: Normal heart sounds  Pulmonary:      Effort: Pulmonary effort is normal  No respiratory distress  Breath sounds: Normal breath sounds  No wheezing, rhonchi or rales  Skin:     General: Skin is warm and dry  Neurological:      General: No focal deficit present  Mental Status: She is alert     Psychiatric:         Mood and Affect: Mood normal          Behavior: Behavior normal          Labs & Imaging:  Lab Results   Component Value Date    WBC 6 36 2020    HGB 12 0 2020    HCT 40 4 2020 MCV 91 05/12/2020     05/12/2020     Lab Results   Component Value Date    SODIUM 138 05/12/2020    K 3 9 05/12/2020     05/12/2020    CO2 27 05/12/2020    BUN 20 05/12/2020    CREATININE 0 85 05/12/2020    GLUC 103 04/01/2019    CALCIUM 9 4 05/12/2020     Lab Results   Component Value Date    VEISQRWP31 213 05/12/2020     Lab Results   Component Value Date    OHU1KMNDADBT 1 030 05/12/2020     Lab Results   Component Value Date    YEPV63DASJWF 37 2 05/12/2020      No results found for this or any previous visit        Ace Ritchie, [address] [facility]  02/16/2023

## 2023-02-21 NOTE — ASSESSMENT & PLAN NOTE
· Blood pressure trend reviewed with stable blood pressure and heart rate readings, per HPI  · Will discontinue Losartan to decrease risk of hypotension and decrease pill burden  · Continue Metoprolol 50 mg daily     · Monitor for hypotension and bradycardia  · Continue furosemide prn for sob/edema

## 2023-05-26 ENCOUNTER — TELEPHONE (OUTPATIENT)
Dept: OTHER | Facility: OTHER | Age: 88
End: 2023-05-26

## 2023-05-26 NOTE — TELEPHONE ENCOUNTER
Pt  Bittinger Kashif out of bed and they found her on the floor  She has a quarter size bump on her head  She is not on blood thinners and she is awake and alert                             d

## 2023-07-03 ENCOUNTER — IN HOME VISIT (OUTPATIENT)
Dept: GERIATRICS | Facility: OTHER | Age: 88
End: 2023-07-03
Payer: COMMERCIAL

## 2023-07-03 DIAGNOSIS — Z71.89 GOALS OF CARE, COUNSELING/DISCUSSION: ICD-10-CM

## 2023-07-03 DIAGNOSIS — F03.90 DEMENTIA WITHOUT BEHAVIORAL DISTURBANCE, PSYCHOTIC DISTURBANCE, MOOD DISTURBANCE, OR ANXIETY, UNSPECIFIED DEMENTIA SEVERITY, UNSPECIFIED DEMENTIA TYPE (HCC): Primary | ICD-10-CM

## 2023-07-03 DIAGNOSIS — R26.2 AMBULATORY DYSFUNCTION: ICD-10-CM

## 2023-07-03 DIAGNOSIS — R53.1 GENERALIZED WEAKNESS: ICD-10-CM

## 2023-07-03 DIAGNOSIS — R06.2 WHEEZING: ICD-10-CM

## 2023-07-03 DIAGNOSIS — I10 BENIGN ESSENTIAL HYPERTENSION: ICD-10-CM

## 2023-07-03 PROCEDURE — 99349 HOME/RES VST EST MOD MDM 40: CPT | Performed by: NURSE PRACTITIONER

## 2023-07-03 NOTE — PROGRESS NOTES
[facility] at [facility]  [address] [facility], [address] [place] [address]  [phone]    In Home Acute Visit  POS 13    ASSESSMENT AND PLAN:  1. Dementia without behavioral disturbance, psychotic disturbance, mood disturbance, or anxiety, unspecified dementia severity, unspecified dementia type [facility])  Assessment & Plan:  · Patient with change in mental status of unknown etiology. Differentials include byut not limited to CVA, UTI, respiratory infection, cardiac abnormality, Further work up necessary to treat underlying cause. · Son, [place], does not wish to have invasive testing at this time or hospitalization. He wishes  to wait a couple of days to see if her condition improves for further goals of care discussion. · Will monitor vital signs and neuro checks for three days. · Continue nutritional and ADL support  · Encourage adequate hydration      2. Generalized weakness  Assessment & Plan:  · Multifactorial in the setting of multiple co-morbidities and advanced age. · Continue nutritional and ADL support  · Monitor skin integrity  · Maintain  fall precautions      3. Goals of care, counseling/discussion  Assessment & Plan:  · Goals of care discussion with son POA  · Son educated on treatment options vs comfort/hospice care services. · He does not want invasive testing or hospitalization at this time. · He prefers to wait a few days to make a decision regarding pursuing hospice services. 4. Benign essential hypertension  Assessment & Plan:  · Goal livan < 150/90; avoid hypotension  · BP currently stable at 131/66  · Continue metoprolol 50 mg po daily  · Continue furosemide prn for sob/edema  · Monitor frequent vital signs for 3 days. 5. Ambulatory dysfunction  Assessment & Plan:  · Increased weakness reported  · Continue to encourage roller walker for ambulation with assistance due to acute illness  · Maintain fall precautions.        6. Wheezing  Assessment & Plan:  · Continue duo-neb treatments  · Monitor for worsening signs/symptoms. Name: Gilford Montane Case      :  1922      Sex: Female       HPI:    8 year old female patient with underlying dementia, essential hypertension, hypothyroidism, macular degeneration, hyperlipidemia. Resident of the [facility] memory unit at [facility]. She is seen and examined today per nursing request for change in mental status and generalized weakness that started today. Upon examination, patient is sitting in her chair, in no acute distress. She opens her eyes but does not respond verbally to voice or touch. She is unable to give ROS related to acuity of condition. The following portions of the patient's history were reviewed and updated as appropriate: allergies, current medications, past family history, past medical history, past social history, past surgical history and problem list.    ROS: Review of Systems   Unable to perform ROS: Acuity of condition       No Known Allergies    Medications:    Current Outpatient Medications on File Prior to Visit   Medication Sig Dispense Refill   • acetaminophen (TYLENOL) 325 mg tablet Take by mouth every 6 (six) hours as needed for mild pain or fever AND GENERAL DISCOMFORT.      • Banana Flakes (BANATROL PO) Take 1 Dose by mouth 3 (three) times a day Before meals     • Cholecalciferol (VITAMIN D) 2000 units CAPS Take 1 capsule (2,000 Units total) by mouth daily 30 capsule 5   • Corn Starch POWD Apply topically     • furosemide (LASIX) 20 mg tablet Take 20 mg by mouth daily     • ipratropium-albuterol (DUO-NEB) 0.5-2.5 mg/3 mL nebulizer solution Take 3 mL by nebulization in the morning     • levothyroxine 112 mcg tablet Take 1 tablet (112 mcg total) by mouth daily 90 tablet 3   • magnesium hydroxide (MILK OF MAGNESIA) 400 mg/5 mL oral suspension Take 30 mL by mouth daily as needed for constipation     • Melatonin 3 MG CAPS Take 6 mg by mouth every evening     • metoprolol succinate (TOPROL-XL) 50 mg 24 hr tablet 50 mg     • polyethylene glycol-propylene glycol (SYSTANE) 0.4-0.3 % Administer 1 drop to both eyes 3 (three) times a day FOR DRY EYES     • potassium chloride (K-DUR,KLOR-CON) 10 mEq tablet Take 10 mEq by mouth daily as needed As needed when prn lasix is given       No current facility-administered medications on file prior to visit. History:  Past Medical History:   Diagnosis Date   • Ambulatory dysfunction 3/9/2022   • Cognitive impairment    • Hearing loss due to cerumen impaction, bilateral 2021   • Hyperlipidemia    • Hypertension    • Hypothyroidism    • Macular degeneration    • Vitamin D deficiency      Past Surgical History:   Procedure Laterality Date   • APPENDECTOMY     •  SECTION     • COLONOSCOPY N/A 3/31/2019    Procedure: COLONOSCOPY;  Surgeon: Dorothy Durham MD;  [facility];  Service: Gastroenterology   • ESOPHAGOGASTRODUODENOSCOPY N/A 3/31/2019    Procedure: ESOPHAGOGASTRODUODENOSCOPY (EGD); Surgeon: Dorothy Durham MD;  [facility];  Service: Gastroenterology   • GALLBLADDER SURGERY       Family History   Problem Relation Age of Onset   • Lung cancer Mother    • Diabetes Father         mellitus   • Atrial fibrillation Son      Social History     Socioeconomic History   • Marital status:       Spouse name: Not on file   • Number of children: Not on file   • Years of education: Not on file   • Highest education level: Not on file   Occupational History   • Occupation: retired   Tobacco Use   • Smoking status: Former   • Smokeless tobacco: Never   • Tobacco comments:     never  smoker ( as per allscripts)   Substance and Sexual Activity   • Alcohol use: No   • Drug use: No   • Sexual activity: Not on file   Other Topics Concern   • Not on file   Social History Narrative    Lives with adult children     Social Determinants of Health     Financial Resource Strain: Not on file   Food Insecurity: Not on file Transportation Needs: Not on file   Physical Activity: Not on file   Stress: Not on file   Social Connections: Not on file   Intimate Partner Violence: Not on file   Housing Stability: Not on file     Past Surgical History:   Procedure Laterality Date   • APPENDECTOMY     •  SECTION     • COLONOSCOPY N/A 3/31/2019    Procedure: COLONOSCOPY;  Surgeon: Octavio Marie MD;  [facility];  Service: Gastroenterology   • ESOPHAGOGASTRODUODENOSCOPY N/A 3/31/2019    Procedure: ESOPHAGOGASTRODUODENOSCOPY (EGD); Surgeon: Octavio Marie MD;  [facility];  Service: Gastroenterology   • GALLBLADDER SURGERY         OBJECTIVE:  Vital Signs:  BP: 131/66, Temp 97.2, HR 88, SpO2 95% RA     Physical Exam  Constitutional:       General: She is not in acute distress. Appearance: She is ill-appearing. She is not toxic-appearing or diaphoretic. HENT:      Head: Normocephalic and atraumatic. Right Ear: External ear normal.      Left Ear: External ear normal.   Cardiovascular:      Rate and Rhythm: Normal rate and regular rhythm. Pulses: Normal pulses. Pulmonary:      Effort: Pulmonary effort is normal. No respiratory distress. Breath sounds: Wheezing present. No rhonchi or rales. Abdominal:      General: Bowel sounds are normal. There is no distension. Palpations: Abdomen is soft. There is no mass. Tenderness: There is no abdominal tenderness. There is no guarding. Musculoskeletal:         General: No signs of injury. Skin:     General: Skin is warm and dry. Coloration: Skin is not jaundiced or pale. Findings: No erythema. Neurological:      Mental Status: She is disoriented. Labs & Imaging:  Pertinent labs/imaging reviewed in point click care, facility EMR.     Hugh Kelly[address]  2023

## 2023-07-04 PROBLEM — Z71.89 GOALS OF CARE, COUNSELING/DISCUSSION: Status: ACTIVE | Noted: 2023-07-03

## 2023-07-05 ENCOUNTER — IN HOME VISIT (OUTPATIENT)
Dept: GERIATRICS | Facility: OTHER | Age: 88
End: 2023-07-05
Payer: COMMERCIAL

## 2023-07-05 DIAGNOSIS — R26.2 AMBULATORY DYSFUNCTION: ICD-10-CM

## 2023-07-05 DIAGNOSIS — R53.1 GENERALIZED WEAKNESS: ICD-10-CM

## 2023-07-05 DIAGNOSIS — I10 BENIGN ESSENTIAL HYPERTENSION: ICD-10-CM

## 2023-07-05 DIAGNOSIS — F03.90 DEMENTIA WITHOUT BEHAVIORAL DISTURBANCE, PSYCHOTIC DISTURBANCE, MOOD DISTURBANCE, OR ANXIETY, UNSPECIFIED DEMENTIA SEVERITY, UNSPECIFIED DEMENTIA TYPE (HCC): Primary | ICD-10-CM

## 2023-07-05 DIAGNOSIS — R06.2 WHEEZING: ICD-10-CM

## 2023-07-05 DIAGNOSIS — Z71.89 GOALS OF CARE, COUNSELING/DISCUSSION: ICD-10-CM

## 2023-07-05 PROCEDURE — 99350 HOME/RES VST EST HIGH MDM 60: CPT | Performed by: NURSE PRACTITIONER

## 2023-07-05 NOTE — ASSESSMENT & PLAN NOTE
· Multifactorial in the setting of multiple co-morbidities and advanced age.    · Continue nutritional and ADL support  · Monitor skin integrity  · Maintain  fall precautions

## 2023-07-05 NOTE — PROGRESS NOTES
[facility] at [address]  [phone]     In Home Acute Visit  POS 13     ASSESSMENT AND PLAN:  1. Dementia without behavioral disturbance, psychotic disturbance, mood disturbance, or anxiety, unspecified dementia severity, unspecified dementia type [facility])  Assessment & Plan:  • Patient with change in mental status of unknown etiology. Differentials include but not limited to CVA, UTI, respiratory infection, cardiac abnormality, worsening dementia. Further work up necessary to treat underlying cause. • Son, Fredis Chen, does not wish to have invasive testing or hospitalization. He for comfort care. .   • Hospice services consulted to evaluate and treat  • Continue nutritional and ADL support  • Encourage adequate hydration as tolerated        2. Generalized weakness  Assessment & Plan:  • Multifactorial in the setting of multiple co-morbidities and advanced age. • Continue nutritional and ADL support  • Monitor skin integrity  • Maintain fall precautions        3. Goals of care, counseling/discussion  Assessment & Plan:  • Goals of care discussion with son POA and daughter in law. • Son educated on treatment options vs comfort/hospice care services. • He does not want invasive testing or hospitalization. Goal of care is comfort. • Will consult hospice services to evaluate and treat. 4. Benign essential hypertension  Assessment & Plan:  • Goal livan < 150/90; avoid hypotension  • BP currently stable   • Continue metoprolol 50 mg po daily  • Continue furosemide prn for sob/edema        5. Ambulatory dysfunction  Assessment & Plan:  • Increased weakness reported  • Continue to encourage roller walker for ambulation with assistance due to acute illness  • Maintain fall precautions. 6. Wheezing  Assessment & Plan:  • Continue duo-neb treatments  • Monitor for worsening signs/symptoms.               Name: Natalie Seo Case : 1922 Sex: Female         HPI:   8 year old female patient with underlying dementia, essential hypertension, hypothyroidism, macular degeneration, hyperlipidemia. Resident of the [facility] memory unit at [facility]. She is seen and examined today per nursing request for change in mental status and generalized weakness that started a few days ago. Patient continues to decline, she  is reported to not be eating or taking her medications. Upon examination, at bedside patient is  in no acute distress. She opens her eyes but does not respond verbally to voice or touch. She is unable to give ROS related to acuity of condition. The following portions of the patient's history were reviewed and updated as appropriate: allergies, current medications, past family history, past medical history, past social history, past surgical history and problem list.     ROS: Review of Systems   Unable to perform ROS: Acuity of condition         No Known Allergies     Medications:          Current Outpatient Medications on File Prior to Visit   Medication Sig Dispense Refill   • acetaminophen (TYLENOL) 325 mg tablet Take by mouth every 6 (six) hours as needed for mild pain or fever AND GENERAL DISCOMFORT.        • Banana Flakes (BANATROL PO) Take 1 Dose by mouth 3 (three) times a day Before meals       • Cholecalciferol (VITAMIN D) 2000 units CAPS Take 1 capsule (2,000 Units total) by mouth daily 30 capsule 5   • Corn Starch POWD Apply topically       • furosemide (LASIX) 20 mg tablet Take 20 mg by mouth daily       • ipratropium-albuterol (DUO-NEB) 0.5-2.5 mg/3 mL nebulizer solution Take 3 mL by nebulization in the morning       • levothyroxine 112 mcg tablet Take 1 tablet (112 mcg total) by mouth daily 90 tablet 3   • magnesium hydroxide (MILK OF MAGNESIA) 400 mg/5 mL oral suspension Take 30 mL by mouth daily as needed for constipation       • Melatonin 3 MG CAPS Take 6 mg by mouth every evening       • metoprolol succinate (TOPROL-XL) 50 mg 24 hr tablet 50 mg       • polyethylene glycol-propylene glycol (SYSTANE) 0.4-0.3 % Administer 1 drop to both eyes 3 (three) times a day FOR DRY EYES       • potassium chloride (K-DUR,KLOR-CON) 10 mEq tablet Take 10 mEq by mouth daily as needed As needed when prn lasix is given          No current facility-administered medications on file prior to visit. History:  Medical HistoryExpand by Default        Past Medical History:   Diagnosis Date   • Ambulatory dysfunction 3/9/2022   • Cognitive impairment     • Hearing loss due to cerumen impaction, bilateral 2021   • Hyperlipidemia     • Hypertension     • Hypothyroidism     • Macular degeneration     • Vitamin D deficiency           Surgical History         Past Surgical History:   Procedure Laterality Date   • APPENDECTOMY       •  SECTION       • COLONOSCOPY N/A 3/31/2019     Procedure: COLONOSCOPY; Surgeon: Mildred Valencia MD; Location: [facility]; Service: Gastroenterology   • ESOPHAGOGASTRODUODENOSCOPY N/A 3/31/2019     Procedure: ESOPHAGOGASTRODUODENOSCOPY (EGD); Surgeon: Mildred Valencia MD; [facility]; Service: Gastroenterology   • [address] [place] [address],[address] by Default         Family History   Problem Relation Age of Onset   • Lung cancer Mother     • Diabetes Father       mellitus   • Atrial fibrillation Son           Social History   Expand by Default            Socioeconomic History   • Marital status:         Spouse name: Not on file   • Number of children: Not on file   • Years of education: Not on file   • Highest education level: Not on file   Occupational History   • Occupation: retired   Tobacco Use   • Smoking status: Former   • Smokeless tobacco: Never   • Tobacco comments:       never smoker ( as per allscripts)   Substance and Sexual Activity   • Alcohol use: No   • Drug use: No   • Sexual activity: Not on file   Other Topics Concern   • Not on file   Social History Narrative Lives with adult children      Social Determinants of Health      Financial Resource Strain: Not on file   Food Insecurity: Not on file   Transportation Needs: Not on file   Physical Activity: Not on file   Stress: Not on file   Social Connections: Not on file   Intimate Partner Violence: Not on file   Housing Stability: Not on file         Surgical HistoryExpand by Default         Past Surgical History:   Procedure Laterality Date   • APPENDECTOMY       •  SECTION       • COLONOSCOPY N/A 3/31/2019     Procedure: COLONOSCOPY; Surgeon: Chaitanya Rice MD; [facility]; Service: Gastroenterology   • ESOPHAGOGASTRODUODENOSCOPY N/A 3/31/2019     Procedure: ESOPHAGOGASTRODUODENOSCOPY (EGD); Surgeon: Chaitanya Rice MD; [facility]; Service: Gastroenterology   • GALLBLADDER SURGERY                OBJECTIVE:  Physical Exam  Constitutional:   General: She is not in acute distress. Appearance: She is ill-appearing. She is not toxic-appearing or diaphoretic. HENT:   Head: Normocephalic and atraumatic. Right Ear: External ear normal.   Left Ear: External ear normal.   Cardiovascular:   Rate and Rhythm: Normal rate and regular rhythm. Pulses: Normal pulses. Pulmonary:   Effort: Pulmonary effort is normal. No respiratory distress. Breath sounds: Wheezing present. No rhonchi or rales. Abdominal:   General: Bowel sounds are normal. There is no distension. Palpations: Abdomen is soft. There is no mass. Tenderness: There is no abdominal tenderness. There is no guarding. Musculoskeletal:   General: No signs of injury. Skin:  General: Skin is warm and dry. Coloration: Skin is not jaundiced or pale. Findings: No erythema. Neurological:   Mental Status: She is disoriented. Labs & Imaging:  Pertinent labs/imaging reviewed in point click care, facility EMR.      Time spent: Greater than 60 minutes with 50% of the time reviewing patient condition, counseling and coordination of care along with goals of care discussion with son (POA) and daughter in law.      Cassie Liu, [address] [facility]  07/05/2023

## 2023-07-05 NOTE — ASSESSMENT & PLAN NOTE
· Goal livan < 150/90; avoid hypotension  · BP currently stable at 131/66  · Continue metoprolol 50 mg po daily  · Continue furosemide prn for sob/edema  · Monitor frequent vital signs for 3 days.

## 2023-07-05 NOTE — ASSESSMENT & PLAN NOTE
· Increased weakness reported  · Continue to encourage roller walker for ambulation with assistance due to acute illness  · Maintain fall precautions.

## 2023-07-05 NOTE — ASSESSMENT & PLAN NOTE
· Goals of care discussion with son POA  · Son educated on treatment options vs comfort/hospice care services. · He does not want invasive testing or hospitalization at this time. · He prefers to wait a few days to make a decision regarding pursuing hospice services.

## 2023-07-05 NOTE — ASSESSMENT & PLAN NOTE
· Patient with change in mental status of unknown etiology. Differentials include byut not limited to CVA, UTI, respiratory infection, cardiac abnormality, Further work up necessary to treat underlying cause. · Son, [place], does not wish to have invasive testing at this time or hospitalization. He wishes  to wait a couple of days to see if her condition improves for further goals of care discussion. · Will monitor vital signs and neuro checks for three days.    · Continue nutritional and ADL support  · Encourage adequate hydration

## (undated) DEVICE — WORKING LENGTH 235CM, WORKING CHANNEL 2.8MM: Brand: RESOLUTION 360 CLIP